# Patient Record
Sex: FEMALE | Race: WHITE | Employment: OTHER | URBAN - METROPOLITAN AREA
[De-identification: names, ages, dates, MRNs, and addresses within clinical notes are randomized per-mention and may not be internally consistent; named-entity substitution may affect disease eponyms.]

---

## 2017-05-24 ENCOUNTER — HOSPITAL ENCOUNTER (OUTPATIENT)
Dept: MAMMOGRAPHY | Age: 67
Discharge: HOME | End: 2017-05-24

## 2017-05-24 DIAGNOSIS — Z12.31 VISIT FOR SCREENING MAMMOGRAM: ICD-10-CM

## 2017-05-24 PROCEDURE — G0202 SCR MAMMO BI INCL CAD: HCPCS

## 2017-11-06 ENCOUNTER — HOSPITAL ENCOUNTER (OUTPATIENT)
Dept: CT IMAGING | Age: 67
Discharge: HOME | End: 2017-11-06
Attending: FAMILY MEDICINE
Payer: MEDICARE

## 2017-11-06 DIAGNOSIS — R07.81 PLEURITIC CHEST PAIN: ICD-10-CM

## 2017-11-06 DIAGNOSIS — J90 PLEURAL EFFUSION: ICD-10-CM

## 2017-11-06 LAB — CREAT BLD-MCNC: 0.7 MG/DL (ref 0.6–1.3)

## 2017-11-06 PROCEDURE — 71260 CT THORAX DX C+: CPT

## 2017-11-06 PROCEDURE — 74011250636 HC RX REV CODE- 250/636: Performed by: FAMILY MEDICINE

## 2017-11-06 PROCEDURE — 74011636320 HC RX REV CODE- 636/320: Performed by: FAMILY MEDICINE

## 2017-11-06 PROCEDURE — 82565 ASSAY OF CREATININE: CPT

## 2017-11-06 RX ORDER — SODIUM CHLORIDE 0.9 % (FLUSH) 0.9 %
10 SYRINGE (ML) INJECTION
Status: COMPLETED | OUTPATIENT
Start: 2017-11-06 | End: 2017-11-06

## 2017-11-06 RX ORDER — SODIUM CHLORIDE 9 MG/ML
50 INJECTION, SOLUTION INTRAVENOUS
Status: COMPLETED | OUTPATIENT
Start: 2017-11-06 | End: 2017-11-06

## 2017-11-06 RX ADMIN — Medication 10 ML: at 18:41

## 2017-11-06 RX ADMIN — SODIUM CHLORIDE 50 ML/HR: 900 INJECTION, SOLUTION INTRAVENOUS at 18:40

## 2017-11-06 RX ADMIN — IOPAMIDOL 100 ML: 612 INJECTION, SOLUTION INTRAVENOUS at 18:40

## 2017-11-13 RX ORDER — FLUTICASONE PROPIONATE AND SALMETEROL 250; 50 UG/1; UG/1
1 POWDER RESPIRATORY (INHALATION) 2 TIMES DAILY
COMMUNITY

## 2017-11-13 RX ORDER — POTASSIUM CHLORIDE 20 MEQ/1
20 TABLET, EXTENDED RELEASE ORAL DAILY
COMMUNITY
End: 2017-12-20

## 2017-11-14 ENCOUNTER — HOSPITAL ENCOUNTER (OUTPATIENT)
Age: 67
Setting detail: OUTPATIENT SURGERY
Discharge: HOME | End: 2017-11-14
Attending: INTERNAL MEDICINE | Admitting: INTERNAL MEDICINE
Payer: MEDICARE

## 2017-11-14 VITALS
HEART RATE: 97 BPM | RESPIRATION RATE: 15 BRPM | DIASTOLIC BLOOD PRESSURE: 74 MMHG | SYSTOLIC BLOOD PRESSURE: 150 MMHG | OXYGEN SATURATION: 91 % | TEMPERATURE: 98.4 F

## 2017-11-14 PROCEDURE — 77030009046 HC CATH BRNCH BLLN OCOA -B: Performed by: INTERNAL MEDICINE

## 2017-11-14 PROCEDURE — 99152 MOD SED SAME PHYS/QHP 5/>YRS: CPT

## 2017-11-14 PROCEDURE — 88173 CYTOPATH EVAL FNA REPORT: CPT | Performed by: INTERNAL MEDICINE

## 2017-11-14 PROCEDURE — 76040000007: Performed by: INTERNAL MEDICINE

## 2017-11-14 PROCEDURE — 74011000250 HC RX REV CODE- 250: Performed by: INTERNAL MEDICINE

## 2017-11-14 PROCEDURE — 74011250636 HC RX REV CODE- 250/636

## 2017-11-14 PROCEDURE — 77030015483 HC NDL ASPIR OCOA -B: Performed by: INTERNAL MEDICINE

## 2017-11-14 PROCEDURE — 77030012699 HC VLV SUC CNTRL OCOA -A: Performed by: INTERNAL MEDICINE

## 2017-11-14 PROCEDURE — 77030022556 HC FCPS BIOP TIS ENDOSC BSC -B: Performed by: INTERNAL MEDICINE

## 2017-11-14 PROCEDURE — 88305 TISSUE EXAM BY PATHOLOGIST: CPT | Performed by: INTERNAL MEDICINE

## 2017-11-14 PROCEDURE — 88172 CYTP DX EVAL FNA 1ST EA SITE: CPT | Performed by: INTERNAL MEDICINE

## 2017-11-14 PROCEDURE — 74011250636 HC RX REV CODE- 250/636: Performed by: INTERNAL MEDICINE

## 2017-11-14 RX ORDER — SODIUM CHLORIDE 0.9 % (FLUSH) 0.9 %
5-10 SYRINGE (ML) INJECTION AS NEEDED
Status: DISCONTINUED | OUTPATIENT
Start: 2017-11-14 | End: 2017-11-14 | Stop reason: HOSPADM

## 2017-11-14 RX ORDER — ATROPINE SULFATE 0.1 MG/ML
0.5 INJECTION INTRAVENOUS
Status: DISCONTINUED | OUTPATIENT
Start: 2017-11-14 | End: 2017-11-14 | Stop reason: HOSPADM

## 2017-11-14 RX ORDER — DIPHENHYDRAMINE HYDROCHLORIDE 50 MG/ML
50 INJECTION, SOLUTION INTRAMUSCULAR; INTRAVENOUS ONCE
Status: COMPLETED | OUTPATIENT
Start: 2017-11-14 | End: 2017-11-14

## 2017-11-14 RX ORDER — NALOXONE HYDROCHLORIDE 0.4 MG/ML
0.4 INJECTION, SOLUTION INTRAMUSCULAR; INTRAVENOUS; SUBCUTANEOUS
Status: DISCONTINUED | OUTPATIENT
Start: 2017-11-14 | End: 2017-11-14 | Stop reason: HOSPADM

## 2017-11-14 RX ORDER — MIDAZOLAM HYDROCHLORIDE 1 MG/ML
.25-5 INJECTION, SOLUTION INTRAMUSCULAR; INTRAVENOUS
Status: DISCONTINUED | OUTPATIENT
Start: 2017-11-14 | End: 2017-11-14 | Stop reason: HOSPADM

## 2017-11-14 RX ORDER — SODIUM CHLORIDE 0.9 % (FLUSH) 0.9 %
5-10 SYRINGE (ML) INJECTION EVERY 8 HOURS
Status: DISCONTINUED | OUTPATIENT
Start: 2017-11-14 | End: 2017-11-14 | Stop reason: HOSPADM

## 2017-11-14 RX ORDER — FLUMAZENIL 0.1 MG/ML
0.2 INJECTION INTRAVENOUS
Status: DISCONTINUED | OUTPATIENT
Start: 2017-11-14 | End: 2017-11-14 | Stop reason: HOSPADM

## 2017-11-14 RX ORDER — LIDOCAINE HYDROCHLORIDE 40 MG/ML
10 SOLUTION TOPICAL AS NEEDED
Status: DISCONTINUED | OUTPATIENT
Start: 2017-11-14 | End: 2017-11-14 | Stop reason: HOSPADM

## 2017-11-14 RX ORDER — FENTANYL CITRATE 50 UG/ML
25-50 INJECTION, SOLUTION INTRAMUSCULAR; INTRAVENOUS
Status: DISCONTINUED | OUTPATIENT
Start: 2017-11-14 | End: 2017-11-14 | Stop reason: HOSPADM

## 2017-11-14 RX ORDER — SODIUM CHLORIDE 9 MG/ML
100 INJECTION, SOLUTION INTRAVENOUS CONTINUOUS
Status: DISCONTINUED | OUTPATIENT
Start: 2017-11-14 | End: 2017-11-14 | Stop reason: HOSPADM

## 2017-11-14 RX ORDER — ONDANSETRON 2 MG/ML
INJECTION INTRAMUSCULAR; INTRAVENOUS
Status: COMPLETED
Start: 2017-11-14 | End: 2017-11-14

## 2017-11-14 RX ORDER — LIDOCAINE HYDROCHLORIDE 40 MG/ML
30 SOLUTION TOPICAL AS NEEDED
Status: DISCONTINUED | OUTPATIENT
Start: 2017-11-14 | End: 2017-11-14 | Stop reason: HOSPADM

## 2017-11-14 RX ORDER — ONDANSETRON 2 MG/ML
4-8 INJECTION INTRAMUSCULAR; INTRAVENOUS ONCE
Status: DISCONTINUED | OUTPATIENT
Start: 2017-11-14 | End: 2017-11-14 | Stop reason: HOSPADM

## 2017-11-14 RX ADMIN — SODIUM CHLORIDE 100 ML/HR: 900 INJECTION, SOLUTION INTRAVENOUS at 12:10

## 2017-11-14 RX ADMIN — FENTANYL CITRATE 25 MCG: 50 INJECTION, SOLUTION INTRAMUSCULAR; INTRAVENOUS at 13:13

## 2017-11-14 RX ADMIN — LIDOCAINE HYDROCHLORIDE 10 ML: 40 SOLUTION TOPICAL at 12:30

## 2017-11-14 RX ADMIN — FENTANYL CITRATE 50 MCG: 50 INJECTION, SOLUTION INTRAMUSCULAR; INTRAVENOUS at 12:55

## 2017-11-14 RX ADMIN — MIDAZOLAM HYDROCHLORIDE 0.5 MG: 1 INJECTION, SOLUTION INTRAMUSCULAR; INTRAVENOUS at 13:15

## 2017-11-14 RX ADMIN — MIDAZOLAM HYDROCHLORIDE 0.5 MG: 1 INJECTION, SOLUTION INTRAMUSCULAR; INTRAVENOUS at 13:19

## 2017-11-14 RX ADMIN — DIPHENHYDRAMINE HYDROCHLORIDE 50 MG: 50 INJECTION, SOLUTION INTRAMUSCULAR; INTRAVENOUS at 12:55

## 2017-11-14 RX ADMIN — ONDANSETRON 4 MG: 2 INJECTION INTRAMUSCULAR; INTRAVENOUS at 12:38

## 2017-11-14 RX ADMIN — MIDAZOLAM HYDROCHLORIDE 2 MG: 1 INJECTION, SOLUTION INTRAMUSCULAR; INTRAVENOUS at 12:55

## 2017-11-14 RX ADMIN — FENTANYL CITRATE 25 MCG: 50 INJECTION, SOLUTION INTRAMUSCULAR; INTRAVENOUS at 13:19

## 2017-11-14 NOTE — PROGRESS NOTES
Notified Dr Eli Mccloud of patients allergy to Lidocaine and her listed reaction as nausea and headache. Okay to proceed with administration of pre procedure lidocaine and monitor for nausea. Orders received for zofran as needed for nausea.

## 2017-11-14 NOTE — DISCHARGE INSTRUCTIONS
[facility] [place]  Pulmonary, Critical Care, and Sleep Medicine    Name: Portia Serrano MRN: [id number]   : 1950 Hospital: [facility]   Date: 2017        BRONCHOSCOPY / EBUS DISCHARGE INSTRUCTIONS  Discomfort:  Sore throat- throat lozenges or warm salt water gargle  redness at IV site- apply warm compress to area; if redness or soreness persist- contact your physician  Gaseous discomfort- walking, belching will help relieve any discomfort  Should not operate a vehicle for at least 12 hours  You should not engage in an occupation involving machinery or appliances for rest of today  You should not drink alcoholic beverages for at least 12 hours  Avoid making any critical decisions for at least 24 hour  Blood tinged secretions - this should stop within 2-3 hours  DIET  Nothing by mouth- do not eat or drink for two hours. You may eat and drink after 2:30 pm.   You may resume your regular diet - however -  remember your colon is empty and a heavy meal will produce gas. Avoid these foods:  vegetables, fried / greasy foods, carbonated drinks  ACTIVITY  You may resume your normal daily activities however it is recommended that you spend the remainder of the day resting -  avoid any strenuous activity. CALL M.D. ANY SIGN OF   Increasing pain, nausea, vomiting  New increased bleeding  Fever (chills)  Pain in chest area  Bloody discharge from nose or mouth  Shortness of breath  Bubbles under the skin around the collarbone. These may crackle and pop when you press on them. Coughing up more than ½ cup of blood. Call physicians office for the following  Telephone #  [phone]  Additional instructions: If you have not heard about your next appointments by Friday, please call the number listed above. [employer] Being [employer] Activation    Thank you for requesting access to [employer] Being [employer]. Please follow the instructions below to securely access and download your online medical record.  [employer] Being Watched allows you to send messages to your doctor, view your test results, renew your prescriptions, schedule appointments, and more. How Do I Sign Up? 1. In your internet browser, go to [email or web address]  2. Click on the First Time User? Click Here link in the Sign In box. You will be redirect to the New Member Sign Up page. 3. Enter your [employer] Access Code exactly as it appears below. You will not need to use this code after youve completed the sign-up process. If you do not sign up before the expiration date, you must request a new code. [employer] Access Code: [email or web address]  Expires: 2018 12:52 PM (This is the date your [employer] access code will )    4. Enter the last four digits of your Social Security Number (xxxx) and Date of Birth (mm/dd/yyyy) as indicated and click Submit. You will be taken to the next sign-up page. 5. Create a Arantech ID. This will be your Ar[employer] login ID and cannot be changed, so think of one that is secure and easy to remember. 6. Create a [employer] password. You can change your password at any time. 7. Enter your Password Reset Question and Answer. This can be used at a later time if you forget your password. 8. Enter your e-mail address. You will receive e-mail notification when new information is available in [address] 9. Click Sign Up. You can now view and download portions of your medical record. 10. Click the Download Summary menu link to download a portable copy of your medical information. Additional Information    If you have questions, please visit the Frequently Asked Questions section of the [employer] website at [email or web address] Remember, [employer] is NOT to be used for urgent needs. For medical emergencies, dial 911.

## 2017-11-14 NOTE — ROUTINE PROCESS
Selina Manley  1950  [id number]    Situation:  Verbal report received from: Hal Serrano RN  Procedure: Procedure(s):  ENDOSCOPIC BRONCHOSCOPY ULTRASOUND (EBUS) (CON-SEDATION WITH SALINE IV)  BRONCHOSCOPY    Background:    Preoperative diagnosis: MULTIPLE PULMONARY NODULES  Postoperative diagnosis: abnormal chest CT;adenocarcinoma    :  Dr. Sreedhar Iqbal  Assistant(s): Endoscopy RN-1: Madisyn Flowers  Endoscopy RN-2: Craig Kulkarni RN; Ashely Wall RN    Specimens: * No specimens in log *  H. Pylori  no    Assessment:  Intra-procedure medications   Versed  yes3 mg  Fentanyl yes 100 mcg  Demerol no  Benadryl  Yes 50 mcg    Anesthesia gave intra-procedure sedation and medications, see anesthesia flow sheet no    Intravenous fluids: NS@ KVO     Vital signs stable       Abdominal assessment: round and soft       Recommendation:  Discharge patient per MD order  .     Family or Friend   here  Anastasiya Cooley to share finding with family or friend yes

## 2017-11-14 NOTE — PROGRESS NOTES
No crepitus noted. Pt aware to check for this and to report to Dr. Valentino Massa if noted. Late entry: At 1409 upon discharge- no crepitus. No respiratory distress.

## 2017-11-14 NOTE — H&P
Date of Surgery Update:  Kendall Farris was seen and examined. History and physical has been reviewed. The patient has been examined.  There have been no significant clinical changes since the completion of the originally dated History and Physical.    Signed By: Amadou Kennedy MD     November 14, 2017 12:50 PM

## 2017-11-14 NOTE — PROCEDURES
[facility]  Pulmonary, Critical Care, and Sleep Medicine    Name: Portia Serrano MRN: [id number]   : 1950 Hospital: [facility]   Date: 2017        Bronchoscopy with Endobronchial Ultrasound-Guided Transbronchial Needle Aspiration    Procedure: Diagnostic bronchoscopy. Indication: Abnormal chest imaging    Consent/Treatment: Informed consent was obtained from the  patient after risks, benefits and alternatives were explained. Timeout verified the correct patient and correct procedure. Anesthesia:   Topical sedation to nares, mouth, and tracheobronchial tree with lidocaine  Moderate sedation with Fentanyl 100 mcg, Versed 3mg and Benadryl 50 mg was used  Moderate (conscious) sedation was administered by the endoscopy nurse and supervised by the endoscopist.  The following parameters were monitored: oxygen saturation, heart rate, blood pressure, respiratory rate, EKG, adequacy of pulmonary ventilation, and response to care. Total physician intraservice time was 25 minutes. Procedure Details:   -- The bronchoscope was introduced orally with use of a bite block. -- The vocal cords were found to be normal.  -- The trachea and dariana were completely inspected and were found to be normal.  -- The right-sided endobronchial anatomy was completely inspected and was found to be normal.  -- The left-sided endobronchial anatomy was completely inspected and was found to be normal.     The bronchoscope was then withdrawn and an EBUS bronchoscope was introduced into the trachea.  Mediastinal inspection revealed:    STATION SIZE TBNA   2L -    2R  -    4L -    4R -    7 -    10L -    10R -    11L 2 cm x1   11R            The procedure was terminated due to patient tolerability (coughing, awake despite sedation)    Specimens:   Transbronchial needle aspiration from the 11L lymph node was sent for  cytology    Rapid On-Site Evaluation: A preliminary diagnosis of adenocarcinoma    Complications: none    Estimated Blood Loss: Minimal    Iris Turner MD     Procedure terminated after 1 (positive) biopsy due to patient tolerability. Hopefully she will be a surgical candidate. If so, further tissue for EGFR mutation, etc, can be obtained at that time. If not, would need repeat procedure with anesthesia.

## 2017-11-14 NOTE — IP AVS SNAPSHOT
[address] [facility] 
[phone] Patient: Jasmin Lindsey MRN: [id number] PIM:73/96/0526 About your hospitalization You were admitted on:  November 14, 2017 You last received care in the:  [facility] ENDOSCOPY You were discharged on:  November 14, 2017 Why you were hospitalized Your primary diagnosis was:  Not on File Discharge Orders None A check zana indicates which time of day the medication should be taken. My Medications TAKE these medications as instructed Instructions Each Dose to Equal  
 Morning Noon Evening Bedtime ADVAIR DISKUS 250-50 mcg/dose diskus inhaler Generic drug:  fluticasone-salmeterol Your last dose was: Your next dose is: Take 1 Puff by inhalation two (2) times a day. 1 Puff  
    
   
   
   
  
 albuterol 2.5 mg /3 mL (0.083 %) nebulizer solution Commonly known as:  PROVENTIL VENTOLIN Your last dose was: Your next dose is:    
   
   
 by Nebulization route once. Only for emergencies or if an infection sets in  
     
   
   
   
  
 aspirin delayed-release 81 mg tablet Your last dose was: Your next dose is: Take  by mouth daily. fenofibrate 160 mg tablet Commonly known as:  LOFIBRA Your last dose was: Your next dose is: Take 160 mg by mouth daily. 160 mg KLOR-CON M20 20 mEq tablet Generic drug:  potassium chloride Your last dose was: Your next dose is: Take 20 mEq by mouth daily. 20 mEq MULTIVITAMIN PO Your last dose was: Your next dose is: Take  by mouth. PROBIOTIC PO Your last dose was: Your next dose is: Take  by mouth. 1 daily SYMBICORT 160-4.5 mcg/actuation Hfaa Generic drug:  budesonide-formoterol Your last dose was: Your next dose is: Take 2 Puffs by inhalation two (2) times a day. 2 Puff  
    
   
   
   
  
 triamterene-hydroCHLOROthiazide 37.5-25 mg per capsule Commonly known as:  Blessing Dotsonly Your last dose was: Your next dose is: Take  by mouth daily. ZETIA 10 mg tablet Generic drug:  ezetimibe Your last dose was: Your next dose is: Take  by mouth. Discharge Instructions [facility] [place] Pulmonary, Critical Care, and Sleep Medicine Name: Trent Martinez MRN: [id number] : 1950 Hospital: [facility] Date: 2017 BRONCHOSCOPY / EBUS DISCHARGE INSTRUCTIONS Discomfort: 
Sore throat- throat lozenges or warm salt water gargle 
redness at IV site- apply warm compress to area; if redness or soreness persist- contact your physician Gaseous discomfort- walking, belching will help relieve any discomfort Should not operate a vehicle for at least 12 hours You should not engage in an occupation involving machinery or appliances for rest of today You should not drink alcoholic beverages for at least 12 hours Avoid making any critical decisions for at least 24 hour Blood tinged secretions  this should stop within 2-3 hours DIET Nothing by mouth- do not eat or drink for two hours. You may eat and drink after 2:30 pm. 
 You may resume your regular diet  however -  remember your colon is empty and a heavy meal will produce gas. Avoid these foods:  vegetables, fried / greasy foods, carbonated drinks ACTIVITY You may resume your normal daily activities however it is recommended that you spend the remainder of the day resting -  avoid any strenuous activity. CALL M.D. ANY SIGN OF Increasing pain, nausea, vomiting New increased bleeding Fever (chills) Pain in chest area Bloody discharge from nose or mouth Shortness of breath Bubbles under the skin around the collarbone. These may crackle and pop when you press on them. Coughing up more than ½ cup of blood. Call physicians office for the following Telephone #  [phone] Additional instructions: If you have not heard about your next appointments by Friday, please call the number listed above. MyChart Activation Thank you for requesting access to [employer]. Please follow the instructions below to securely access and download your online medical record. [employer] allows you to send messages to your doctor, view your test results, renew your prescriptions, schedule appointments, and more. How Do I Sign Up? 1. In your internet browser, go to [email or web address] 
2. Click on the First Time User? Click Here link in the Sign In box. You will be redirect to the New Member Sign Up page. 3. Enter your [employer] Access Code exactly as it appears below. You will not need to use this code after youve completed the sign-up process. If you do not sign up before the expiration date, you must request a new code. [employer] Access Code: [email or web address] Expires: 2018 12:52 PM (This is the date your [employer] access code will ) 4. Enter the last four digits of your Social Security Number (xxxx) and Date of Birth (mm/dd/yyyy) as indicated and click Submit. You will be taken to the next sign-up page. 5. Create a [employer] ID. This will be your [employer] login ID and cannot be changed, so think of one that is secure and easy to remember. 6. Create a [employer] password. You can change your password at any time. 7. Enter your Password Reset Question and Answer. This can be used at a later time if you forget your password. 8. Enter your e-mail address. You will receive e-mail notification when new information is available in [address] 9. Click Sign Up. You can now view and download portions of your medical record. 10. Click the Download Summary menu link to download a portable copy of your medical information. Additional Information If you have questions, please visit the Frequently Asked Questions section of the [employer] website at [email or web address]t/. Remember, [employer]ros is NOT to be used for urgent needs. For medical emergencies, dial 911. Introducing [facility] & [facility] SERVICES! Alexandr Duncan introduces Gyros patient portal. Now you can access parts of your medical record, email your doctor's office, and request medication refills online. 1. In your internet browser, go to [email or web address] 2. Click on the First Time User? Click Here link in the Sign In box. You will see the New Member Sign Up page. 3. Enter your Gyros Access Code exactly as it appears below. You will not need to use this code after youve completed the sign-up process. If you do not sign up before the expiration date, you must request a new code. · Gyros Access Code: [email or web address] Expires: 2/4/2018 12:52 PM 
 
4. Enter the last four digits of your Social Security Number (xxxx) and Date of Birth (mm/dd/yyyy) as indicated and click Submit. You will be taken to the next sign-up page. 5. Create a Gyros ID. This will be your Gyros login ID and cannot be changed, so think of one that is secure and easy to remember. 6. Create a Gyros password. You can change your password at any time. 7. Enter your Password Reset Question and Answer. This can be used at a later time if you forget your password. 8. Enter your e-mail address. You will receive e-mail notification when new information is available in [address] 9. Click Sign Up. You can now view and download portions of your medical record. 10. Click the Download Summary menu link to download a portable copy of your medical information. If you have questions, please visit the Frequently Asked Questions section of the MyChart website. Remember, MyChart is NOT to be used for urgent needs. For medical emergencies, dial 911. Now available from your iPhone and Android! Providers Seen During Your Hospitalization Provider Specialty Primary office phone Amadou Kennedy MD Pulmonary Disease [phone] Your Primary Care Physician (PCP) Primary Care Physician Office Phone Office Fax Ginger Deluna [phone] You are allergic to the following Allergen Reactions Demerol (Meperidine) Nausea and Vomiting Doxycycline Other (comments) Female infection Erythromycin Other (comments) Female infection Hydrocodone Itching Dizzy, Headache Penicillins Other (comments) From childhood Percodan (Oxycodone Hcl-Oxycodone-Asa) Nausea and Vomiting  
 headache  
    
 Sulfa (Sulfonamide Antibiotics) Swelling Tetracycline Other (comments) Female infection Xylocaine (Lidocaine Hcl) Nausea Only  
 headache Recent Documentation Breastfeeding? OB Status Smoking Status No Hysterectomy Former Smoker Emergency Contacts Name Discharge Info Relation Home Work Mobile Pool,Jonh DISCHARGE CAREGIVER [3] Spouse [3] [phone] Patient Belongings The following personal items are in your possession at time of discharge: 
  Dental Appliances: None  Visual Aid: Glasses, With patient Please provide this summary of care documentation to your next provider. Signatures-by signing, you are acknowledging that this After Visit Summary has been reviewed with you and you have received a copy. Patient Signature:  ____________________________________________________________ Date:  ____________________________________________________________  
  
Janyth Mins Provider Signature:  ____________________________________________________________ Date:  ____________________________________________________________

## 2017-11-20 ENCOUNTER — HOSPITAL ENCOUNTER (OUTPATIENT)
Dept: PET IMAGING | Age: 67
Discharge: HOME | End: 2017-11-20
Attending: INTERNAL MEDICINE
Payer: MEDICARE

## 2017-11-20 VITALS — BODY MASS INDEX: 27.66 KG/M2 | HEIGHT: 64 IN | WEIGHT: 162 LBS

## 2017-11-20 DIAGNOSIS — C34.90 LUNG CANCER (HCC): ICD-10-CM

## 2017-11-20 PROCEDURE — A9552 F18 FDG: HCPCS

## 2017-11-20 RX ORDER — SODIUM CHLORIDE 0.9 % (FLUSH) 0.9 %
10 SYRINGE (ML) INJECTION
Status: COMPLETED | OUTPATIENT
Start: 2017-11-20 | End: 2017-11-20

## 2017-11-20 RX ADMIN — Medication 10 ML: at 11:00

## 2017-12-04 ENCOUNTER — OFFICE VISIT (OUTPATIENT)
Dept: SURGERY | Age: 67
End: 2017-12-04

## 2017-12-04 ENCOUNTER — HOSPITAL ENCOUNTER (OUTPATIENT)
Dept: LAB | Age: 67
Discharge: HOME | End: 2017-12-04

## 2017-12-04 VITALS
RESPIRATION RATE: 18 BRPM | WEIGHT: 158.6 LBS | HEIGHT: 65 IN | BODY MASS INDEX: 26.42 KG/M2 | DIASTOLIC BLOOD PRESSURE: 83 MMHG | OXYGEN SATURATION: 97 % | HEART RATE: 99 BPM | SYSTOLIC BLOOD PRESSURE: 148 MMHG

## 2017-12-04 DIAGNOSIS — C34.90 MALIGNANT NEOPLASM OF LUNG, UNSPECIFIED LATERALITY, UNSPECIFIED PART OF LUNG (HCC): Primary | ICD-10-CM

## 2017-12-04 PROCEDURE — 81235 EGFR GENE COM VARIANTS: CPT | Performed by: SURGERY

## 2017-12-04 PROCEDURE — 88342 IMHCHEM/IMCYTCHM 1ST ANTB: CPT | Performed by: SURGERY

## 2017-12-04 NOTE — MR AVS SNAPSHOT
Visit Information Date & Time Provider Department Dept. Phone Encounter #  
 12/4/2017  9:40 AM Hermila Wells MD [facility] [place] [id number] Upcoming Health Maintenance Date Due Hepatitis C Screening 1950 DTaP/Tdap/Td series (1 - Tdap) 10/15/1971 FOBT Q 1 YEAR AGE 50-75 10/15/2000 ZOSTER VACCINE AGE 60> 8/15/2010 GLAUCOMA SCREENING Q2Y 10/15/2015 OSTEOPOROSIS SCREENING (DEXA) 10/15/2015 Pneumococcal 65+ High/Highest Risk (1 of 2 - PCV13) 10/15/2015 MEDICARE YEARLY EXAM 10/15/2015 Influenza Age 5 to Adult 8/1/2017 BREAST CANCER SCRN MAMMOGRAM 5/24/2019 Allergies as of 12/4/2017  Review Complete On: 12/4/2017 By: Hermila Wells MD  
  
 Severity Noted Reaction Type Reactions Demerol [Meperidine]  04/02/2014    Nausea and Vomiting Doxycycline  04/02/2014    Other (comments) Female infection Erythromycin  04/02/2014    Other (comments) Female infection Hydrocodone  08/13/2014   Systemic Itching Dizzy, Headache Penicillins  04/02/2014    Other (comments) From childhood Percodan [Oxycodone Hcl-oxycodone-asa]  04/02/2014    Nausea and Vomiting  
 headache  
 Sulfa (Sulfonamide Antibiotics)  04/02/2014    Swelling Tetracycline  04/02/2014    Other (comments) Female infection Xylocaine [Lidocaine Hcl]  04/02/2014    Nausea Only  
 headache Current Immunizations  Never Reviewed No immunizations on file. Not reviewed this visit You Were Diagnosed With   
  
 Codes Comments Malignant neoplasm of lung, unspecified laterality, unspecified [facility]    -  Primary ICD-10-CM: C34.90 ICD-9-CM: 162.9 Vitals BP Pulse Resp Height(growth percentile) Weight(growth percentile) SpO2  
 148/83 (BP 1 Location: Left arm, BP Patient Position: Sitting) 99 18 5' 5\" (1.651 m) 158 lb 9.6 oz (71.9 kg) 97% BMI OB Status Smoking Status 26.39 kg/m2 Hysterectomy Former Smoker Vitals History BMI and BSA Data Body Mass Index Body Surface Area  
 26.39 kg/m 2 1.82 m 2 Preferred Pharmacy Pharmacy Name Phone [facility][id number] [address] [phone] Your Updated Medication List  
  
   
This list is accurate as of: 12/4/17  4:49 PM.  Always use your most recent med list.  
  
  
  
  
 Junella Moores 250-50 mcg/dose diskus inhaler Generic drug:  fluticasone-salmeterol Take 1 Puff by inhalation two (2) times a day. albuterol 2.5 mg /3 mL (0.083 %) nebulizer solution Commonly known as:  PROVENTIL VENTOLIN  
by Nebulization route once. Only for emergencies or if an infection sets in  
  
 aspirin delayed-release 81 mg tablet Take  by mouth daily. fenofibrate 160 mg tablet Commonly known as:  LOFIBRA Take 160 mg by mouth daily. KLOR-CON M20 20 mEq tablet Generic drug:  potassium chloride Take 20 mEq by mouth daily. MULTIVITAMIN PO Take  by mouth. PROBIOTIC PO Take  by mouth. 1 daily SYMBICORT 160-4.5 mcg/actuation Hfaa Generic drug:  budesonide-formoterol Take 2 Puffs by inhalation two (2) times a day. triamterene-hydroCHLOROthiazide 37.5-25 mg per capsule Commonly known as:  Jag Plana Take  by mouth daily. ZETIA 10 mg tablet Generic drug:  ezetimibe Take  by mouth. We Performed the Following SURGICAL PATHOLOGY [CEE1067 Custom] To-Do List   
 12/07/2017 7:45 PM  
  Appointment with [facility] MRI 2 at [facility] MRI Department ([phone]) 1. Please bring a list or a bag of your current medications to your appointment 2. Please be sure to remove ALL hair clips, pins, extensions, etc., prior to arriving for your MRI procedure. 3. If you have any medical implants or devices, please bring associated medical card with you.  4. Bring any non [facility] films or CDs pertaining to the area being imaged with you on the day of appointment. 5. A written order with a valid diagnosis and Physicians  signature is required for all scheduled tests. 6. Check in at registration 30min before your appointment time unless you were instructed to do otherwise. Introducing [facility] & HEALTH SERVICES! Dear Carlin Aguilar: Thank you for requesting a [employer] account. Our records indicate that you already have an active [employer] account. You can access your account anytime at [email or web address] Did you know that you can access your hospital and ER discharge instructions at any time in [employer]? You can also review all of your test results from your hospital stay or ER visit. Additional Information If you have questions, please visit the Frequently Asked Questions section of the [employer] website at [email or web address] Remember, [employer] is NOT to be used for urgent needs. For medical emergencies, dial 911. Now available from your iPhone and Android! Please provide this summary of care documentation to your next provider. Your primary care clinician is listed as Mayito Nails. If you have any questions after today's visit, please call [phone].

## 2017-12-06 NOTE — PROGRESS NOTES
To: Dr. Caprice Chao  CC:  Hortensia Talamantes MD, Tiff Sweet MD    From: Lionel Garcia MD    Thank you for sending Hema Funez to see us. Encounter Date: 12/4/2017  History and Physical    Assessment:   Known stage IV non-small cell lung cancer. PET avid left supraclavicular mass. Plan/Recommendations/Medical Decision Making:   Need for molecular testing to guide therapy. FNA did not provide enough tissue. Will do core biopsies of the left supraclavicular mass today. Risks and benefits discussed. She wishes to proceed. HPI:   Patient is a 79 y.o. female who is seen in consultation at the request of Dr. Caprice Chao. She has been diagnosis via FNA with NSCLC. Appears to have primary in the MYESHA and PET shows mediastinal LAD, bony mets and a PET avid mass/LAD in the left supraclavicular region. She has seen Dr. Deven Mendoza who is planning treatment, but need additional tissue for molecular testing.      Past Medical History:   Diagnosis Date    Arthritis     hands    Back pain     Cancer ([address]     scalp on back of head    Chronic obstructive pulmonary disease (HCC)     Diverticulitis     Hypercholesterolemia     Hypertension     IBS (irritable bowel syndrome)     Menopause 1982    Muscle ache       Past Surgical History:   Procedure Laterality Date    [address] [facility] [address]  [address]    umbilical    HX HYSTERECTOMY  1982    HX OOPHORECTOMY Left 1982    HX OOPHORECTOMY Right 1983    HX OTHER [address] [address]    D&C    [address] [place] [address] [place]    partial hysterectomy    HX OTHER SURGICAL  1982    hysterectomy    HX OTHER SURGICAL  1982    breast implants, [facility]    [address] [facility] OTHER SURGICAL  2011    implant removal, [place] Bilateral [address]158    REMOVAL OF BREAST IMPLANT Bilateral 1989     Social History   Substance Use Topics    Smoking status: Former Smoker     Packs/day: 1.00     Years: 17.00     Quit date: 4/2/1986    Smokeless tobacco: Never Used    Alcohol use No      Family History   Problem Relation Age of Onset    Family history unknown: Yes       Current Outpatient Prescriptions   Medication Sig    potassium chloride (KLOR-CON M20) 20 mEq tablet Take 20 mEq by mouth daily.  fluticasone-salmeterol (ADVAIR DISKUS) 250-50 mcg/dose diskus inhaler Take 1 Puff by inhalation two (2) times a day.  ezetimibe (ZETIA) 10 mg tablet Take  by mouth.  fenofibrate (TRICOR) 160 mg tablet Take 160 mg by mouth daily.  triamterene-hydrochlorothiazide (DYAZIDE) 37.5-25 mg per capsule Take  by mouth daily.  budesonide-formoterol (SYMBICORT) 160-4.5 mcg/actuation HFA inhaler Take 2 Puffs by inhalation two (2) times a day.  LACTOBACILLUS ACIDOPHILUS (PROBIOTIC PO) Take  by mouth. 1 daily    MULTIVITAMIN PO Take  by mouth.  albuterol (PROVENTIL VENTOLIN) 2.5 mg /3 mL (0.083 %) nebulizer solution by Nebulization route once. Only for emergencies or if an infection sets in    aspirin delayed-release 81 mg tablet Take  by mouth daily. No current facility-administered medications for this visit. Allergies: Allergies   Allergen Reactions    Demerol [Meperidine] Nausea and Vomiting    Doxycycline Other (comments)     Female infection    Erythromycin Other (comments)     Female infection    Hydrocodone Itching     Dizzy, Headache    Penicillins Other (comments)     From childhood    Percodan [Oxycodone Hcl-Oxycodone-Asa] Nausea and Vomiting     headache    Sulfa (Sulfonamide Antibiotics) Swelling    Tetracycline Other (comments)     Female infection    Xylocaine [Lidocaine Hcl] Nausea Only     headache        Review of Systems:  10 systems reviewed. See scanned sheet in \"Media\" section. See HPI for pertinent positives and negatives.       Objective:     Visit Vitals    /83 (BP 1 Location: Left arm, BP Patient Position: Sitting)    Pulse 99    Resp 18    Ht 5' 5\" (1.651 m)    Wt 71.9 kg (158 lb 9.6 oz)    SpO2 97%    BMI 26.39 kg/m2     General appearance  Alert, cooperative, no distress, appears stated age   HEENT  Anicteric, palpable shallow mass in the medial left supraclavicular area. No JVD. Neck Supple       Lungs   CTAB   Heart  Regular rate and rhythm. No murmur, rub or gallop   Abdomen   Soft. NT. No obvious masses. Extremities No CCE. Pulses 2+ right radial   Skin Skin color, texture, turgor normal.        Neurologic Without overt sensory or motor deficit         Encounter Date: 12/4/2017    Preoperative diagnosis: above  Postoperative diagnosis: same  Procedure: ultrasound-guided core needle biopsy of the left supraclavicular mass    Time out at performed by me (see consent form):  * Patient was identified by name and date of birth   * Agreement on procedure being performed was verified  * Risks and Benefits explained to the patient  * Procedure site verified and marked as necessary  * Patient was positioned for comfort  * Consent was signed and verified    Description of the procedure: After obtaining informed consent a time out was performed any the patient was taken to the procedure room and ultrasound was used to local localize the target. Betadine was then used to prepped the skin and 2% lidocaine with epinephrine was infiltrated at the planned site of needle insertion. A stab incisions was made with a blade. Using ultrasound guidance the core device was used to sample the mass. 6 cores were taken. No hematoma was noted afterwards. Steri-strips were applied and the procedure was well tolerated. Post-procedure pain scale: 1/10.     Azam Pressley MD

## 2017-12-07 ENCOUNTER — HOSPITAL ENCOUNTER (OUTPATIENT)
Dept: MRI IMAGING | Age: 67
Discharge: HOME | End: 2017-12-07
Attending: INTERNAL MEDICINE
Payer: MEDICARE

## 2017-12-07 VITALS — BODY MASS INDEX: 26.63 KG/M2 | WEIGHT: 160 LBS

## 2017-12-07 DIAGNOSIS — C34.12 PRIMARY MALIGNANT NEOPLASM OF BRONCHUS OF LEFT UPPER LOBE (HCC): ICD-10-CM

## 2017-12-07 PROCEDURE — A9576 INJ PROHANCE MULTIPACK: HCPCS | Performed by: INTERNAL MEDICINE

## 2017-12-07 PROCEDURE — 70553 MRI BRAIN STEM W/O & W/DYE: CPT

## 2017-12-07 PROCEDURE — 74011250636 HC RX REV CODE- 250/636: Performed by: INTERNAL MEDICINE

## 2017-12-07 RX ADMIN — GADOTERIDOL 15 ML: 279.3 INJECTION, SOLUTION INTRAVENOUS at 19:29

## 2017-12-12 ENCOUNTER — TELEPHONE (OUTPATIENT)
Dept: SURGERY | Age: 67
End: 2017-12-12

## 2017-12-12 NOTE — TELEPHONE ENCOUNTER
Chely Miller called back from Dr. Juanito Spivey office. They did receive the results of biopsy. Still waiting on additional testing. They will contact the patient.

## 2017-12-12 NOTE — TELEPHONE ENCOUNTER
Called spoke to Mrs. Sue. Advised her results are in from biopsy. She spoke to Dr. Micheal Lofton yesterday and she said they weren't in. She has appointment with Dr. Micheal Lofton on this Friday. I told her I would contact Dr. Reyez Prior office. Le Reyez Prior assistant message to call back.

## 2017-12-18 ENCOUNTER — TELEPHONE (OUTPATIENT)
Dept: SURGERY | Age: 67
End: 2017-12-18

## 2017-12-20 ENCOUNTER — HOSPITAL ENCOUNTER (OUTPATIENT)
Dept: PREADMISSION TESTING | Age: 67
Discharge: HOME | End: 2017-12-20
Attending: SURGERY
Payer: MEDICARE

## 2017-12-20 VITALS
WEIGHT: 154.1 LBS | BODY MASS INDEX: 24.77 KG/M2 | DIASTOLIC BLOOD PRESSURE: 55 MMHG | RESPIRATION RATE: 20 BRPM | TEMPERATURE: 98.6 F | SYSTOLIC BLOOD PRESSURE: 135 MMHG | HEIGHT: 66 IN | OXYGEN SATURATION: 95 % | HEART RATE: 92 BPM

## 2017-12-20 LAB
ATRIAL RATE: 96 BPM
CALCULATED P AXIS, ECG09: 74 DEGREES
CALCULATED R AXIS, ECG10: 56 DEGREES
CALCULATED T AXIS, ECG11: 76 DEGREES
DIAGNOSIS, 93000: NORMAL
P-R INTERVAL, ECG05: 136 MS
Q-T INTERVAL, ECG07: 362 MS
QRS DURATION, ECG06: 104 MS
QTC CALCULATION (BEZET), ECG08: 457 MS
VENTRICULAR RATE, ECG03: 96 BPM

## 2017-12-20 PROCEDURE — 93005 ELECTROCARDIOGRAM TRACING: CPT

## 2017-12-20 RX ORDER — CHLORHEXIDINE GLUCONATE 1.2 MG/ML
15 RINSE ORAL AS NEEDED
COMMUNITY
End: 2018-01-02 | Stop reason: ALTCHOICE

## 2017-12-20 RX ORDER — SODIUM CHLORIDE, SODIUM LACTATE, POTASSIUM CHLORIDE, CALCIUM CHLORIDE 600; 310; 30; 20 MG/100ML; MG/100ML; MG/100ML; MG/100ML
25 INJECTION, SOLUTION INTRAVENOUS CONTINUOUS
Status: CANCELLED | OUTPATIENT
Start: 2017-12-22

## 2017-12-20 RX ORDER — TRAMADOL HYDROCHLORIDE 50 MG/1
50 TABLET ORAL
COMMUNITY
End: 2018-03-06

## 2017-12-20 RX ORDER — VANCOMYCIN/0.9 % SOD CHLORIDE 1.5G/250ML
1500 PLASTIC BAG, INJECTION (ML) INTRAVENOUS ONCE
Status: CANCELLED | OUTPATIENT
Start: 2017-12-22 | End: 2017-12-22

## 2017-12-20 RX ORDER — BENZONATATE 100 MG/1
100 CAPSULE ORAL
Status: ON HOLD | COMMUNITY
End: 2018-01-04

## 2017-12-20 RX ORDER — FOLIC ACID 1 MG/1
1 TABLET ORAL DAILY
COMMUNITY

## 2017-12-20 NOTE — PERIOP NOTES
[facility]  Preoperative Instructions        Surgery Date 12/22/17          Time of Arrival 0715  Contact # [phone]-- home    1. On the day of your surgery, please report to the Surgical Services Registration Desk and sign in at your designated time. The Surgery Center is located to the right of the Emergency Room. 2. You must have someone with you to drive you home. You should not drive a car for 24 hours following surgery. Please make arrangements for a friend or family member to stay with you for the first 24 hours after your surgery. 3. Do not have anything to eat or drink (including water, gum, mints, coffee, juice) after midnight ?12/21/17   . ? This may not apply to medications prescribed by your physician. ?(Please note below the special instructions with medications to take the morning of your procedure.)    4. We recommend you do not drink any alcoholic beverages for 24 hours before and after your surgery. 5. Contact your surgeons office for instructions on the following medications: non-steroidal anti-inflammatory drugs (i.e. Advil, Aleve), vitamins, and supplements. (Some surgeons will want you to stop these medications prior to surgery and others may allow you to take them)  **If you are currently taking Plavix, Coumadin, Aspirin and/or other blood-thinning agents, contact your surgeon for instructions. ** Your surgeon will partner with the physician prescribing these medications to determine if it is safe to stop or if you need to continue taking. Please do not stop taking these medications without instructions from your surgeon    6. Wear comfortable clothes. Wear glasses instead of contacts. Do not bring any money or jewelry. Please bring picture ID, insurance card, and any prearranged co-payment or hospital payment. Do not wear make-up, particularly mascara the morning of your surgery.   Do not wear nail polish, particularly if you are having foot /hand surgery. Wear your hair loose or down, no ponytails, buns, jose ramon pins or clips. All body piercings must be removed. Please shower with antibacterial soap for three consecutive days before and on the morning of surgery, but do not apply any lotions, powders or deodorants after the shower on the day of surgery. Please use a fresh towels after each shower. Please sleep in clean clothes and change bed linens the night before surgery. Please do not shave for 48 hours prior to surgery. Shaving of the face is acceptable. 7. You should understand that if you do not follow these instructions your surgery may be cancelled. If your physical condition changes (I.e. fever, cold or flu) please contact your surgeon as soon as possible. 8. It is important that you be on time. If a situation occurs where you may be late, please call [phone] (OR Holding Area). 9. If you have any questions and or problems, please call [phone] (Pre-admission Testing). 10. Your surgery time may be subject to change. You will receive a phone call the evening prior if your time changes. 11.  If having outpatient surgery, you must have someone to drive you here, stay with you during the duration of your stay, and to drive you home at time of discharge. 12.   In an effort to improve the efficiency, privacy, and safety for all of our Pre-op patients visitors are not allowed in the Holding area. Once you arrive and are registered your family/visitors will be asked to remain in the waiting room. The Pre-op staff will get you from the Surgical Waiting Area and will explain to you and your family/visitors that the Pre-op phase is beginning. The staff will answer any questions and provide instructions for tracking of the patient, by use of the existing tracking number and color-coded status board in the waiting room.   At this time the staff will also ask for your designated spokesperson information in the event that the physician or staff need to provide an update or obtain any pertinent information. The designated spokesperson will be notified if the physician needs to speak to family during the pre-operative phase. If at any time your family/visitors has questions or concerns they may approach the volunteer desk in the waiting area for assistance. Special Instructions:    MEDICATIONS TO TAKE THE MORNING OF SURGERY WITH A SIP OF WATER:use inhaler (pro-air) as needed--please bring to the hospital--advair diskus,benzonatate as needed,tramadol as needed      I understand a pre-operative phone call will be made to verify my surgery time. In the event that I am not available, I give permission for a message to be left on my answering service and/or with another person?   yes          ___________________      __________   _________    (Signature of Patient)             (Witness)                (Date and Time)

## 2017-12-21 NOTE — ADVANCED PRACTICE NURSE
EKG from 12/20/17 reviewed by Dr. Gordo Parikh, anesthesiologist and compared with 08/13/14. PMHx and planned procedure reviewed. OK to proceed with surgery per Dr. Gordo Parikh.

## 2017-12-22 ENCOUNTER — APPOINTMENT (OUTPATIENT)
Dept: GENERAL RADIOLOGY | Age: 67
End: 2017-12-22
Attending: SURGERY
Payer: MEDICARE

## 2017-12-22 ENCOUNTER — HOSPITAL ENCOUNTER (OUTPATIENT)
Age: 67
Setting detail: OUTPATIENT SURGERY
Discharge: HOME | End: 2017-12-22
Attending: SURGERY | Admitting: SURGERY
Payer: MEDICARE

## 2017-12-22 ENCOUNTER — ANESTHESIA (OUTPATIENT)
Dept: SURGERY | Age: 67
End: 2017-12-22
Payer: MEDICARE

## 2017-12-22 ENCOUNTER — ANESTHESIA EVENT (OUTPATIENT)
Dept: SURGERY | Age: 67
End: 2017-12-22
Payer: MEDICARE

## 2017-12-22 VITALS
DIASTOLIC BLOOD PRESSURE: 45 MMHG | BODY MASS INDEX: 24.87 KG/M2 | WEIGHT: 154.1 LBS | HEART RATE: 103 BPM | OXYGEN SATURATION: 91 % | SYSTOLIC BLOOD PRESSURE: 143 MMHG | RESPIRATION RATE: 20 BRPM | TEMPERATURE: 99.8 F

## 2017-12-22 DIAGNOSIS — C34.90 MALIGNANT NEOPLASM OF LUNG, UNSPECIFIED LATERALITY, UNSPECIFIED PART OF LUNG (HCC): Primary | ICD-10-CM

## 2017-12-22 LAB
APPEARANCE UR: ABNORMAL
BACTERIA URNS QL MICRO: NEGATIVE /HPF
BILIRUB UR QL CFM: NEGATIVE
CAOX CRY URNS QL MICRO: ABNORMAL
COLOR UR: ABNORMAL
EPITH CASTS URNS QL MICRO: ABNORMAL /LPF
GLUCOSE UR STRIP.AUTO-MCNC: NEGATIVE MG/DL
HGB UR QL STRIP: ABNORMAL
KETONES UR QL STRIP.AUTO: NEGATIVE MG/DL
LEUKOCYTE ESTERASE UR QL STRIP.AUTO: ABNORMAL
MUCOUS THREADS URNS QL MICRO: ABNORMAL /LPF
NITRITE UR QL STRIP.AUTO: NEGATIVE
PH UR STRIP: 5.5 [PH] (ref 5–8)
PROT UR STRIP-MCNC: NEGATIVE MG/DL
RBC #/AREA URNS HPF: ABNORMAL /HPF (ref 0–5)
SP GR UR REFRACTOMETRY: 1.03 (ref 1–1.03)
UA: UC IF INDICATED,UAUC: ABNORMAL
UROBILINOGEN UR QL STRIP.AUTO: 1 EU/DL (ref 0.2–1)
WBC URNS QL MICRO: ABNORMAL /HPF (ref 0–4)

## 2017-12-22 PROCEDURE — 76000 FLUOROSCOPY <1 HR PHYS/QHP: CPT

## 2017-12-22 PROCEDURE — 71010 XR CHEST PORT: CPT

## 2017-12-22 PROCEDURE — 77030018673: Performed by: SURGERY

## 2017-12-22 PROCEDURE — 76010000153 HC OR TIME 1.5 TO 2 HR: Performed by: SURGERY

## 2017-12-22 PROCEDURE — 74011250636 HC RX REV CODE- 250/636: Performed by: ANESTHESIOLOGY

## 2017-12-22 PROCEDURE — 74011250636 HC RX REV CODE- 250/636

## 2017-12-22 PROCEDURE — 76210000000 HC OR PH I REC 2 TO 2.5 HR: Performed by: SURGERY

## 2017-12-22 PROCEDURE — 77030031139 HC SUT VCRL2 J&J -A: Performed by: SURGERY

## 2017-12-22 PROCEDURE — 77030020782 HC GWN BAIR PAWS FLX 3M -B

## 2017-12-22 PROCEDURE — 74011250636 HC RX REV CODE- 250/636: Performed by: SURGERY

## 2017-12-22 PROCEDURE — 77030020256 HC SOL INJ NACL 0.9%  500ML: Performed by: SURGERY

## 2017-12-22 PROCEDURE — 81001 URINALYSIS AUTO W/SCOPE: CPT | Performed by: SURGERY

## 2017-12-22 PROCEDURE — 77030011640 HC PAD GRND REM COVD -A: Performed by: SURGERY

## 2017-12-22 PROCEDURE — 74011000250 HC RX REV CODE- 250: Performed by: SURGERY

## 2017-12-22 PROCEDURE — 77030002933 HC SUT MCRYL J&J -A: Performed by: SURGERY

## 2017-12-22 PROCEDURE — 77030010507 HC ADH SKN DERMBND J&J -B: Performed by: SURGERY

## 2017-12-22 PROCEDURE — C1788 PORT, INDWELLING, IMP: HCPCS | Performed by: SURGERY

## 2017-12-22 PROCEDURE — 77030003028 HC SUT VCRL J&J -A: Performed by: SURGERY

## 2017-12-22 PROCEDURE — 87086 URINE CULTURE/COLONY COUNT: CPT | Performed by: SURGERY

## 2017-12-22 PROCEDURE — 74011250637 HC RX REV CODE- 250/637

## 2017-12-22 PROCEDURE — 76210000020 HC REC RM PH II FIRST 0.5 HR: Performed by: SURGERY

## 2017-12-22 PROCEDURE — 77030018836 HC SOL IRR NACL ICUM -A: Performed by: SURGERY

## 2017-12-22 PROCEDURE — 77030008684 HC TU ET CUF COVD -B: Performed by: ANESTHESIOLOGY

## 2017-12-22 PROCEDURE — 77030026438 HC STYL ET INTUB CARD -A: Performed by: ANESTHESIOLOGY

## 2017-12-22 PROCEDURE — 76060000034 HC ANESTHESIA 1.5 TO 2 HR: Performed by: SURGERY

## 2017-12-22 DEVICE — SYSTEM INFUS PRT CATH 8FR L66CM INTRO 8FR CHST TI SGL LUMN: Type: IMPLANTABLE DEVICE | Site: CHEST | Status: FUNCTIONAL

## 2017-12-22 RX ORDER — SUCCINYLCHOLINE CHLORIDE 20 MG/ML
INJECTION INTRAMUSCULAR; INTRAVENOUS AS NEEDED
Status: DISCONTINUED | OUTPATIENT
Start: 2017-12-22 | End: 2017-12-22 | Stop reason: HOSPADM

## 2017-12-22 RX ORDER — ALBUTEROL SULFATE 90 UG/1
AEROSOL, METERED RESPIRATORY (INHALATION) AS NEEDED
Status: DISCONTINUED | OUTPATIENT
Start: 2017-12-22 | End: 2017-12-22 | Stop reason: HOSPADM

## 2017-12-22 RX ORDER — FENTANYL CITRATE 50 UG/ML
25 INJECTION, SOLUTION INTRAMUSCULAR; INTRAVENOUS
Status: DISCONTINUED | OUTPATIENT
Start: 2017-12-22 | End: 2017-12-22 | Stop reason: HOSPADM

## 2017-12-22 RX ORDER — MIDAZOLAM HYDROCHLORIDE 1 MG/ML
1 INJECTION, SOLUTION INTRAMUSCULAR; INTRAVENOUS AS NEEDED
Status: DISCONTINUED | OUTPATIENT
Start: 2017-12-22 | End: 2017-12-22 | Stop reason: HOSPADM

## 2017-12-22 RX ORDER — SODIUM CHLORIDE, SODIUM LACTATE, POTASSIUM CHLORIDE, CALCIUM CHLORIDE 600; 310; 30; 20 MG/100ML; MG/100ML; MG/100ML; MG/100ML
25 INJECTION, SOLUTION INTRAVENOUS CONTINUOUS
Status: DISCONTINUED | OUTPATIENT
Start: 2017-12-22 | End: 2017-12-22 | Stop reason: HOSPADM

## 2017-12-22 RX ORDER — PROPOFOL 10 MG/ML
INJECTION, EMULSION INTRAVENOUS AS NEEDED
Status: DISCONTINUED | OUTPATIENT
Start: 2017-12-22 | End: 2017-12-22 | Stop reason: HOSPADM

## 2017-12-22 RX ORDER — LIDOCAINE HYDROCHLORIDE 10 MG/ML
0.1 INJECTION, SOLUTION EPIDURAL; INFILTRATION; INTRACAUDAL; PERINEURAL AS NEEDED
Status: DISCONTINUED | OUTPATIENT
Start: 2017-12-22 | End: 2017-12-22 | Stop reason: HOSPADM

## 2017-12-22 RX ORDER — MIDAZOLAM HYDROCHLORIDE 1 MG/ML
0.5 INJECTION, SOLUTION INTRAMUSCULAR; INTRAVENOUS
Status: DISCONTINUED | OUTPATIENT
Start: 2017-12-22 | End: 2017-12-22 | Stop reason: HOSPADM

## 2017-12-22 RX ORDER — VANCOMYCIN/0.9 % SOD CHLORIDE 1.5G/250ML
1500 PLASTIC BAG, INJECTION (ML) INTRAVENOUS ONCE
Status: COMPLETED | OUTPATIENT
Start: 2017-12-22 | End: 2017-12-22

## 2017-12-22 RX ORDER — DEXAMETHASONE SODIUM PHOSPHATE 4 MG/ML
INJECTION, SOLUTION INTRA-ARTICULAR; INTRALESIONAL; INTRAMUSCULAR; INTRAVENOUS; SOFT TISSUE AS NEEDED
Status: DISCONTINUED | OUTPATIENT
Start: 2017-12-22 | End: 2017-12-22 | Stop reason: HOSPADM

## 2017-12-22 RX ORDER — MIDAZOLAM HYDROCHLORIDE 1 MG/ML
INJECTION, SOLUTION INTRAMUSCULAR; INTRAVENOUS AS NEEDED
Status: DISCONTINUED | OUTPATIENT
Start: 2017-12-22 | End: 2017-12-22 | Stop reason: HOSPADM

## 2017-12-22 RX ORDER — DOCUSATE SODIUM 100 MG/1
100 CAPSULE, LIQUID FILLED ORAL 2 TIMES DAILY
Qty: 60 CAP | Refills: 0 | Status: SHIPPED | OUTPATIENT
Start: 2017-12-22 | End: 2018-03-06

## 2017-12-22 RX ORDER — DIPHENHYDRAMINE HYDROCHLORIDE 50 MG/ML
12.5 INJECTION, SOLUTION INTRAMUSCULAR; INTRAVENOUS AS NEEDED
Status: DISCONTINUED | OUTPATIENT
Start: 2017-12-22 | End: 2017-12-22 | Stop reason: HOSPADM

## 2017-12-22 RX ORDER — FENTANYL CITRATE 50 UG/ML
INJECTION, SOLUTION INTRAMUSCULAR; INTRAVENOUS AS NEEDED
Status: DISCONTINUED | OUTPATIENT
Start: 2017-12-22 | End: 2017-12-22 | Stop reason: HOSPADM

## 2017-12-22 RX ORDER — PHENYLEPHRINE HCL IN 0.9% NACL 0.4MG/10ML
SYRINGE (ML) INTRAVENOUS AS NEEDED
Status: DISCONTINUED | OUTPATIENT
Start: 2017-12-22 | End: 2017-12-22 | Stop reason: HOSPADM

## 2017-12-22 RX ORDER — ONDANSETRON 2 MG/ML
INJECTION INTRAMUSCULAR; INTRAVENOUS AS NEEDED
Status: DISCONTINUED | OUTPATIENT
Start: 2017-12-22 | End: 2017-12-22 | Stop reason: HOSPADM

## 2017-12-22 RX ORDER — ONDANSETRON 2 MG/ML
4 INJECTION INTRAMUSCULAR; INTRAVENOUS AS NEEDED
Status: DISCONTINUED | OUTPATIENT
Start: 2017-12-22 | End: 2017-12-22 | Stop reason: HOSPADM

## 2017-12-22 RX ORDER — SODIUM CHLORIDE, SODIUM LACTATE, POTASSIUM CHLORIDE, CALCIUM CHLORIDE 600; 310; 30; 20 MG/100ML; MG/100ML; MG/100ML; MG/100ML
100 INJECTION, SOLUTION INTRAVENOUS CONTINUOUS
Status: DISCONTINUED | OUTPATIENT
Start: 2017-12-22 | End: 2017-12-22 | Stop reason: HOSPADM

## 2017-12-22 RX ORDER — TRAMADOL HYDROCHLORIDE 50 MG/1
50-100 TABLET ORAL
Qty: 40 TAB | Refills: 0 | Status: SHIPPED | OUTPATIENT
Start: 2017-12-22 | End: 2018-01-02 | Stop reason: SDUPTHER

## 2017-12-22 RX ORDER — HYDROMORPHONE HYDROCHLORIDE 2 MG/ML
0.5 INJECTION, SOLUTION INTRAMUSCULAR; INTRAVENOUS; SUBCUTANEOUS
Status: DISCONTINUED | OUTPATIENT
Start: 2017-12-22 | End: 2017-12-22 | Stop reason: HOSPADM

## 2017-12-22 RX ORDER — FENTANYL CITRATE 50 UG/ML
INJECTION, SOLUTION INTRAMUSCULAR; INTRAVENOUS AS NEEDED
Status: DISCONTINUED | OUTPATIENT
Start: 2017-12-22 | End: 2017-12-22

## 2017-12-22 RX ORDER — FENTANYL CITRATE 50 UG/ML
50 INJECTION, SOLUTION INTRAMUSCULAR; INTRAVENOUS AS NEEDED
Status: DISCONTINUED | OUTPATIENT
Start: 2017-12-22 | End: 2017-12-22 | Stop reason: HOSPADM

## 2017-12-22 RX ADMIN — SODIUM CHLORIDE, POTASSIUM CHLORIDE, SODIUM LACTATE AND CALCIUM CHLORIDE: 600; 310; 30; 20 INJECTION, SOLUTION INTRAVENOUS at 08:45

## 2017-12-22 RX ADMIN — VANCOMYCIN HYDROCHLORIDE 1 G: 10 INJECTION, POWDER, LYOPHILIZED, FOR SOLUTION INTRAVENOUS at 09:00

## 2017-12-22 RX ADMIN — MIDAZOLAM HYDROCHLORIDE 2 MG: 1 INJECTION, SOLUTION INTRAMUSCULAR; INTRAVENOUS at 09:00

## 2017-12-22 RX ADMIN — DEXAMETHASONE SODIUM PHOSPHATE 10 MG: 4 INJECTION, SOLUTION INTRA-ARTICULAR; INTRALESIONAL; INTRAMUSCULAR; INTRAVENOUS; SOFT TISSUE at 10:02

## 2017-12-22 RX ADMIN — PROPOFOL 130 MG: 10 INJECTION, EMULSION INTRAVENOUS at 09:26

## 2017-12-22 RX ADMIN — Medication 80 MCG: at 09:35

## 2017-12-22 RX ADMIN — FENTANYL CITRATE 50 MCG: 50 INJECTION, SOLUTION INTRAMUSCULAR; INTRAVENOUS at 09:26

## 2017-12-22 RX ADMIN — Medication 120 MCG: at 09:33

## 2017-12-22 RX ADMIN — FENTANYL CITRATE 25 MCG: 50 INJECTION, SOLUTION INTRAMUSCULAR; INTRAVENOUS at 10:55

## 2017-12-22 RX ADMIN — ONDANSETRON 4 MG: 2 INJECTION INTRAMUSCULAR; INTRAVENOUS at 10:02

## 2017-12-22 RX ADMIN — SUCCINYLCHOLINE CHLORIDE 140 MG: 20 INJECTION INTRAMUSCULAR; INTRAVENOUS at 09:26

## 2017-12-22 RX ADMIN — ALBUTEROL SULFATE 6 PUFF: 90 AEROSOL, METERED RESPIRATORY (INHALATION) at 09:29

## 2017-12-22 RX ADMIN — ALBUTEROL SULFATE 6 PUFF: 90 AEROSOL, METERED RESPIRATORY (INHALATION) at 10:21

## 2017-12-22 RX ADMIN — Medication 80 MCG: at 09:37

## 2017-12-22 RX ADMIN — FENTANYL CITRATE 50 MCG: 50 INJECTION, SOLUTION INTRAMUSCULAR; INTRAVENOUS at 09:51

## 2017-12-22 NOTE — PERIOP NOTES
[address] and updated family. Allowed time for questions and answers. [address] and updated family to notify that we are slowly weaning 02 requirements as tolerated. Allowed time for questions and answers. [phone] Provided patient with an Incentive Spirometer and educated on use and purpose. Demonstrated back and achieved a level of 750-1000. Remains on room air with sat's hanging at 88-89% at times decreasing to 86% and increasing to 93%. Dr. Ej Londono updated. 1202 Sat's now maintaining at 85%. Placed on 2L NC with sat's increasing up to 90%. Denies any SOB or Dyspnea. 36 Spoke with Dr. Ej Londono regarding patient's sat's now steady at 89-89%. Patient continues to deny any SOB or Dyspnea. \"I just want to go home. I feel fine. \"  According to CRNA Sat's prior to surgery were 88-89% on room air. Per Dr. Jaspreet Wallace to send home if sat's remain 88-89% or > on room air. 6355 TRANSFER - OUT REPORT:    Verbal report given to Chencho Rodriguez RN(name) on [facility]  being transferred to Phase II(unit) for routine post - op       Report consisted of patients Situation, Background, Assessment and   Recommendations(SBAR). Information from the following report(s) SBAR, Kardex, OR Summary, Procedure Summary, Intake/Output, MAR, Accordion, Recent Results, Med Rec Status, Cardiac Rhythm NSR and Alarm Parameters  was reviewed with the receiving nurse. Opportunity for questions and clarification was provided.       Patient transported with:   Registered Nurse

## 2017-12-22 NOTE — H&P (VIEW-ONLY)
To: Dr. Sudha Ponce  CC:  Luca Arce MD, Wily Gonzalez MD    From: Kavin Loredo MD    Thank you for sending Salina Hough to see us. Encounter Date: 12/4/2017  History and Physical    Assessment:   Known stage IV non-small cell lung cancer. PET avid left supraclavicular mass. Plan/Recommendations/Medical Decision Making:   Need for molecular testing to guide therapy. FNA did not provide enough tissue. Will do core biopsies of the left supraclavicular mass today. Risks and benefits discussed. She wishes to proceed. HPI:   Patient is a 79 y.o. female who is seen in consultation at the request of Dr. Sudha Ponce. She has been diagnosis via FNA with NSCLC. Appears to have primary in the MYESHA and PET shows mediastinal LAD, bony mets and a PET avid mass/LAD in the left supraclavicular region. She has seen Dr. Scott Ragsdale who is planning treatment, but need additional tissue for molecular testing.      Past Medical History:   Diagnosis Date    Arthritis     hands    Back pain     Cancer ([address]     scalp on back of head    Chronic obstructive pulmonary disease (HCC)     Diverticulitis     Hypercholesterolemia     Hypertension     IBS (irritable bowel syndrome)     Menopause 1982    Muscle ache       Past Surgical History:   Procedure Laterality Date    [address] [facility] [address]  [address]    umbilical    HX HYSTERECTOMY  1982    HX OOPHORECTOMY Left 1982    HX OOPHORECTOMY Right 1983    HX OTHER [address]    D&C    [address]    partial hysterectomy    HX OTHER SURGICAL  1982    hysterectomy    HX OTHER SURGICAL  1982    breast implants, [address]    [address] OTHER SURGICAL  2011    implant removal, [place] Bilateral [address]324    REMOVAL OF BREAST IMPLANT Bilateral 1989     Social History   Substance Use Topics    Smoking status: Former Smoker     Packs/day: 1.00     Years: 17.00     Quit date: 4/2/1986    Smokeless tobacco: Never Used    Alcohol use No      Family History   Problem Relation Age of Onset    Family history unknown: Yes       Current Outpatient Prescriptions   Medication Sig    potassium chloride (KLOR-CON M20) 20 mEq tablet Take 20 mEq by mouth daily.  fluticasone-salmeterol (ADVAIR DISKUS) 250-50 mcg/dose diskus inhaler Take 1 Puff by inhalation two (2) times a day.  ezetimibe (ZETIA) 10 mg tablet Take  by mouth.  fenofibrate (TRICOR) 160 mg tablet Take 160 mg by mouth daily.  triamterene-hydrochlorothiazide (DYAZIDE) 37.5-25 mg per capsule Take  by mouth daily.  budesonide-formoterol (SYMBICORT) 160-4.5 mcg/actuation HFA inhaler Take 2 Puffs by inhalation two (2) times a day.  LACTOBACILLUS ACIDOPHILUS (PROBIOTIC PO) Take  by mouth. 1 daily    MULTIVITAMIN PO Take  by mouth.  albuterol (PROVENTIL VENTOLIN) 2.5 mg /3 mL (0.083 %) nebulizer solution by Nebulization route once. Only for emergencies or if an infection sets in    aspirin delayed-release 81 mg tablet Take  by mouth daily. No current facility-administered medications for this visit. Allergies: Allergies   Allergen Reactions    Demerol [Meperidine] Nausea and Vomiting    Doxycycline Other (comments)     Female infection    Erythromycin Other (comments)     Female infection    Hydrocodone Itching     Dizzy, Headache    Penicillins Other (comments)     From childhood    Percodan [Oxycodone Hcl-Oxycodone-Asa] Nausea and Vomiting     headache    Sulfa (Sulfonamide Antibiotics) Swelling    Tetracycline Other (comments)     Female infection    Xylocaine [Lidocaine Hcl] Nausea Only     headache        Review of Systems:  10 systems reviewed. See scanned sheet in \"Media\" section. See HPI for pertinent positives and negatives.       Objective:     Visit Vitals    /83 (BP 1 Location: Left arm, BP Patient Position: Sitting)    Pulse 99    Resp 18    Ht 5' 5\" (1.651 m)    Wt 71.9 kg (158 lb 9.6 oz)    SpO2 97%    BMI 26.39 kg/m2     General appearance  Alert, cooperative, no distress, appears stated age   HEENT  Anicteric, palpable shallow mass in the medial left supraclavicular area. No JVD. Neck Supple       Lungs   CTAB   Heart  Regular rate and rhythm. No murmur, rub or gallop   Abdomen   Soft. NT. No obvious masses. Extremities No CCE. Pulses 2+ right radial   Skin Skin color, texture, turgor normal.        Neurologic Without overt sensory or motor deficit         Encounter Date: 12/4/2017    Preoperative diagnosis: above  Postoperative diagnosis: same  Procedure: ultrasound-guided core needle biopsy of the left supraclavicular mass    Time out at performed by me (see consent form):  * Patient was identified by name and date of birth   * Agreement on procedure being performed was verified  * Risks and Benefits explained to the patient  * Procedure site verified and marked as necessary  * Patient was positioned for comfort  * Consent was signed and verified    Description of the procedure: After obtaining informed consent a time out was performed any the patient was taken to the procedure room and ultrasound was used to local localize the target. Betadine was then used to prepped the skin and 2% lidocaine with epinephrine was infiltrated at the planned site of needle insertion. A stab incisions was made with a blade. Using ultrasound guidance the core device was used to sample the mass. 6 cores were taken. No hematoma was noted afterwards. Steri-strips were applied and the procedure was well tolerated. Post-procedure pain scale: 1/10.     Brian Fontanez MD

## 2017-12-22 NOTE — ANESTHESIA PREPROCEDURE EVALUATION
Anesthetic History   No history of anesthetic complications            Review of Systems / Medical History  Patient summary reviewed, nursing notes reviewed and pertinent labs reviewed    Pulmonary    COPD        Asthma        Neuro/Psych             Comments: Muscle ache (729.1)       Back pain (724.5)  Cardiovascular    Hypertension              Exercise tolerance: >4 METS     GI/Hepatic/Renal  Within defined limits              Endo/Other        Obesity and arthritis     Other Findings              Physical Exam    Airway  Mallampati: II  TM Distance: > 6 cm  Neck ROM: normal range of motion   Mouth opening: Normal     Cardiovascular  Regular rate and rhythm,  S1 and S2 normal,  no murmur, click, rub, or gallop  Rhythm: regular  Rate: normal         Dental    Dentition: Caps/crowns     Pulmonary  Breath sounds clear to auscultation               Abdominal  GI exam deferred       Other Findings            Anesthetic Plan    ASA: 2  Anesthesia type: general and total IV anesthesia          Induction: Intravenous  Anesthetic plan and risks discussed with: Patient      Propofol MAC

## 2017-12-22 NOTE — INTERVAL H&P NOTE
H&P Update:  Tyrell Smith was seen and examined. History and physical has been reviewed. The patient has been examined.  There have been no significant clinical changes since the completion of the originally dated History and Physical.    Signed By: Minerva Pereira MD     December 22, 2017 8:53 AM

## 2017-12-22 NOTE — PERIOP NOTES
Patient taken to OR before Vancomycin could be scanned.   VAnc 1.5 gm in 250 ml normal saline was started at 0850 via pump

## 2017-12-22 NOTE — DISCHARGE INSTRUCTIONS
Discharge Instructions: [facility] -- Dr. Timmy Coombs    Call on next business day to arrange appointment for follow up in 3 weeks [phone]    Activity:  Walk regularly. No lifting more than 10 -15 pounds for 4 weeks. Light aerobic activity is okay when you feel up to it. You may resume driving in three days unless still requiring narcotics for pain. Return to work in 1-2 weeks but no heavy lifting for 4 weeks. Work:  You may return to work in 1 or 2 weeks to light activity. No lifting more than 10 pounds (=\"light duty\") for four weeks. If your employer can not accommodate \"light duty,\" your employer will need to provide any necessary paperwork to our office. This document with serve as the initial \"note\" to your employer. Diet:  You may resume normal diet. Wound Care:  Dermabond glue dressing will fall off on its own. If you experience a lot of drainage, develop redness around the wound, or a fever over 101 F occurs please call the office. You may shower. No baths or swimming for 3 weeks. Ice over incision 20 minutes every hour as needed. Place a cloth between the ice and skin. Medications:  See attached \"Medication Reconciliation. \"    Resume home medications as indicated on the Medical Reconciliation form. Do not use blood thinners (such as Aspirin, Coumadin, or Plavix) until 3 days after surgery. Pain medications:  Non steroidal antiinflammatories (ibuprofen -- Advil or Motrin) seem to work best for post surgical pain. Try these first.  A narcotic prescription will also be given for breakthrough pain. Colace or Miralax should be used twice daily to prevent constipation while on narcotics. If you are still having trouble having a BM after 1-2 days, try milk of magnesia. If this does not work within 24 hours, try a bottle of magnesium citrate. Narcotics and anesthesia sometimes cause nausea and vomiting. If persistent please call the office.     Do not hesitate to call with questions or concerns. If the chemotherapy center has any difficulty accessing you port, ask them to call me and I will help them. Jana Kline MD  Tel [phone]  Fax [phone]      A common side effect of anesthesia following surgery is nausea and/or vomiting. In order to decrease symptoms, it is wise to avoid foods that are high in fat, greasy foods, milk products, and spicy foods for the first 24 hours. Acceptable foods for the first 24 hours following surgery include but are not limited to:     soup   broth    toast    crackers    applesauce    bananas    mashed potatoes,   soft or scrambled eggs   oatmeal    jello    It is important to eat when taking your pain medication. This will help to prevent nausea. If possible, please try to time your meals with your medications. It is very important to stay hydrated following surgery. Sip fluids frequently while awake. Avoid acidic drinks such as citrus juices and soda for 24 hours. Carbonated beverages may cause bloating and gas. Acceptable fluids include:    - water (flavor packets may add variety)  - coffee or tea (in moderation)  - Gatorade  - Hector-aid  - apple juice  - cranberry juice    You are encouraged to cough and deep breathe every hour when awake. This will help to prevent respiratory complications following anesthesia. You may want to hug a pillow when coughing and sneezing to add additional support to the surgical area and to decrease discomfort if you had abdominal or chest surgery. If you are discharged home with support stockings, you may remove them after 24 hours. Support stockings are used to help prevent blood clots in the legs following surgery. Please take time to review all of your Home Care Instructions and Medication Information sheets provided in your discharge packet. If you have any questions, please contact your surgeons office. Thank you.                 How to Care for Your Wound After Its Treated With  DERMABOND* Topical Skin Adhesive  DERMABOND* Topical Skin Adhesive (2-octyl cyanoacrylate) is a sterile, liquid skin adhesive  that holds wound edges together. The film will usually remain in place for 5 to 10 days, then  naturally fall off your skin. The following will answer some of your questions and provide instructions for proper care for your  wound while it is healing:    CHECK WOUND APPEARANCE   Some swelling, redness, and pain are common with all wounds and normally will go away as the  wound heals. If swelling, redness, or pain increases or if the wound feels warm to the touch,  contact a doctor. Also contact a doctor if the wound edges reopen or separate. REPLACE BANDAGES   If your wound is bandaged, keep the bandage dry.  Replace the dressing daily until the adhesive film has fallen off or if the  bandage should become wet, unless otherwise instructed by your  physician.  When changing the dressing, do not place tape directly over the  DERMABOND adhesive film, because removing the tape later may also  remove the film. AVOID TOPICAL MEDICATIONS   Do not apply liquid or ointment medications or any other product to your wound while the  DERMABOND adhesive film is in place. These may loosen the film before your wound is healed. KEEP WOUND DRY AND PROTECTED   You may occasionally and briefly wet your wound in the shower or bath. Do not soak or scrub  your wound, do not swim, and avoid periods of heavy perspiration until the DERMABOND  adhesive has naturally fallen off. After showering or bathing, gently blot your wound dry with a  soft towel. If a protective dressing is being used, apply a fresh, dry bandage, being sure to keep  the tape off the DERMABOND adhesive film.  Apply a clean, dry bandage over the wound if necessary to protect it.    Protect your wound from injury until the skin has had sufficient time to heal.   Do not scratch, rub, or pick at the [facility] adhesive film. This may loosen the film before  your wound is healed.  Protect the wound from prolonged exposure to sunlight or tanning lamps while the film is in  place. If you have any questions or concerns about this product, please consult your doctor. *Trademark ©[employer] 2002            DISCHARGE SUMMARY from Nurse    PATIENT INSTRUCTIONS:    After general anesthesia or intravenous sedation, for 24 hours or while taking prescription Narcotics:  · Limit your activities  · Do not drive and operate hazardous machinery  · Do not make important personal or business decisions  · Do  not drink alcoholic beverages  · If you have not urinated within 8 hours after discharge, please contact your surgeon on call. Report the following to your surgeon:  · Excessive pain, swelling, redness or odor of or around the surgical area  · Temperature over 100.5  · Nausea and vomiting lasting longer than 4 hours or if unable to take medications  · Any signs of decreased circulation or nerve impairment to extremity: change in color, persistent  numbness, tingling, coldness or increase pain  · Any questions    What to do at Home:  Recommended activity: Activity as tolerated and no driving for today or while taking narcotic pain medications. If you experience any of the symptoms noted above, please follow up with Dr. Marlen Arce. *  Please give a list of your current medications to your Primary Care Provider. *  Please update this list whenever your medications are discontinued, doses are      changed, or new medications (including over-the-counter products) are added. *  Please carry medication information at all times in case of emergency situations. These are general instructions for a healthy lifestyle:    No smoking/ No tobacco products/ Avoid exposure to second hand smoke  Surgeon General's Warning:  Quitting smoking now greatly reduces serious risk to your health.     Obesity, smoking, and sedentary lifestyle greatly increases your risk for illness    A healthy diet, regular physical exercise & weight monitoring are important for maintaining a healthy lifestyle    You may be retaining fluid if you have a history of heart failure or if you experience any of the following symptoms:  Weight gain of 3 pounds or more overnight or 5 pounds in a week, increased swelling in our hands or feet or shortness of breath while lying flat in bed. Please call your doctor as soon as you notice any of these symptoms; do not wait until your next office visit. Recognize signs and symptoms of STROKE:    F-face looks uneven    A-arms unable to move or move unevenly    S-speech slurred or non-existent    T-time-call 911 as soon as signs and symptoms begin-DO NOT go       Back to bed or wait to see if you get better-TIME IS BRAIN. Warning Signs of HEART ATTACK     Call 911 if you have these symptoms:   Chest discomfort. Most heart attacks involve discomfort in the center of the chest that lasts more than a few minutes, or that goes away and comes back. It can feel like uncomfortable pressure, squeezing, fullness, or pain.  Discomfort in other areas of the upper body. Symptoms can include pain or discomfort in one or both arms, the back, neck, jaw, or stomach.  Shortness of breath with or without chest discomfort.  Other signs may include breaking out in a cold sweat, nausea, or lightheadedness. Don't wait more than five minutes to call 911 - MINUTES MATTER! Fast action can save your life. Calling 911 is almost always the fastest way to get lifesaving treatment. Emergency Medical Services staff can begin treatment when they arrive -- up to an hour sooner than if someone gets to the hospital by car. The discharge information has been reviewed with the patient and caregiver. The patient and caregiver verbalized understanding.   Discharge medications reviewed with the patient and caregiver and appropriate educational materials and side effects teaching were provided. ___________________________________________________________________________________________________________________________________         Tramadol (Ultram, Ultram ER, [place], Theratramadol-60) - (By mouth)   Why this medicine is used:   Treats moderate to severe pain. This medicine is a narcotic pain reliever. Contact a nurse or doctor right away if you have:  · Extreme weakness, shallow breathing  · Seizures  · Seeing or hearing things that are not there  · Anxiety, restlessness, muscle spasms, fever, sweating, diarrhea  · Slow or fast heartbeat     Common side effects:  · Nausea, vomiting, constipation  · Headache, drowsiness  · Itching, feeling of warmth, redness of the face, neck, arms, and upper chest  © 2017 [address] Information is for End User's use only and may not be sold, redistributed or otherwise used for commercial purposes. Laxative, Stimulant Combination (By mouth)   Treats constipation by helping you have a bowel movement. Brand Name(s): Colace, Doc-Q-Lax, Dok Plus, [facility] Stool Softener & Laxative, Good Sense Stimulant Laxative Plus, Laxacin, Medi-Laxx, Tatyana-Colace, [employer] Laxative and Stool Softener, [employer] P Col-Rite, Senexon-S, Senna-S, Senna-Time S, SennaLax-S, SennaPrompt   There may be other brand names for this medicine. When This Medicine Should Not Be Used: You should not use this medicine if you have had an allergic reaction to senna, sennosides, docusate, casanthranol, or psyllium. Some brand names for these medicines are Correctol® stool softener, Ex-Lax®, Colace®, or Metamucil®. Make sure your doctor knows if you are allergic to any other laxative medicines. How to Use This Medicine:   Tablet, Liquid Filled Capsule, Liquid, Granule, Capsule, Powder for Suspension  · Your doctor will tell you how much medicine to use. Do not use more than directed.   · If you have had a sudden change in your bowel movements in the past two weeks, ask your doctor before using this medicine. · Follow the instructions on the medicine label if you are using this medicine without a prescription. · Drink a full glass of water when you take this medicine. One full glass of water is about 8 ounces or 1 cup. Drinking 6 to 8 full glasses of water every day will help keep your bowel movements soft. · You might need to mix the granules or powder with water before you take each dose. Drink this mixture right away. Do not swallow the granules or powder dry unless the directions say you can. · Measure the oral liquid medicine with a marked measuring spoon, oral syringe, or medicine cup. · Use this medicine at bedtime, unless your doctor tells you otherwise. If a dose is missed:   · Take a dose as soon as you remember. If it is almost time for your next dose, wait until then and take a regular dose. Do not take extra medicine to make up for a missed dose. How to Store and Dispose of This Medicine:   · Store the medicine in a closed container at room temperature, away from heat, moisture, and direct light. · Ask your pharmacist, doctor, or health caregiver about the best way to dispose of any outdated medicine or medicine no longer needed. · Keep all medicine out of the reach of children. Never share your medicine with anyone. Drugs and Foods to Avoid:   Ask your doctor or pharmacist before using any other medicine, including over-the-counter medicines, vitamins, and herbal products. · Make sure your doctor knows if you are also using mineral oil. · Some laxatives need to be taken 2 hours before or 2 hours after other medicines. If you need to take any other medicine, ask your health caregiver if you need to follow a special schedule. Warnings While Using This Medicine:   · Make sure your doctor knows if you are pregnant or breast feeding.   · Do not use this medicine if you have stomach pain, nausea, or vomiting, unless your health caregiver tells you to use it. · If you do not have a bowel movement after using this medicine, talk to your doctor. Most people will have a bowel movement within 6 to 12 hours after using this laxative. · You should not use this laxative for more than 1 week unless your doctor says it is okay. Laxatives may be habit-forming and can harm your bowels if you use them too long. Possible Side Effects While Using This Medicine:   Call your doctor right away if you notice any of these side effects:  · Bleeding from your rectum. · Skin rash. · Urine turns a different color. If you notice these less serious side effects, talk with your doctor:   · Diarrhea, cramps, nausea, burping. If you notice other side effects that you think are caused by this medicine, tell your doctor. Call your doctor for medical advice about side effects. You may report side effects to FDA at 4-675-FDA-1370  © 2017 [address] Information is for End User's use only and may not be sold, redistributed or otherwise used for commercial purposes. The above information is an  only. It is not intended as medical advice for individual conditions or treatments. Talk to your doctor, nurse or pharmacist before following any medical regimen to see if it is safe and effective for you.

## 2017-12-22 NOTE — PERIOP NOTES
Handoff Report from Operating Room to PACU    Report received from EARL Fortune CRNA and Skyla Shearer RN regarding Keshav Torres. Surgeon(s):  Portia Stockton MD  And Procedure(s) (LRB):  PORTACATH INSERTION (Right)  confirmed   with allergies and dressings discussed. Anesthesia type, drugs, patient history, complications, estimated blood loss, vital signs, intake and output, and last pain medication, lines, reversal medications and temperature were reviewed.

## 2017-12-22 NOTE — ANESTHESIA POSTPROCEDURE EVALUATION
Post-Anesthesia Evaluation and Assessment    Patient: Clare Bond MRN: [id number]  SSN: [id number]    YOB: 1950  Age: 79 y.o. Sex: female       Cardiovascular Function/Vital Signs  Visit Vitals    /61 (BP 1 Location: Left arm, BP Patient Position: At rest)    Pulse 98    Temp 36.8 °C (98.2 °F)    Resp 21    Wt 69.9 kg (154 lb 1.6 oz)    SpO2 94%    BMI 24.87 kg/m2       Patient is status post general, total IV anesthesia anesthesia for Procedure(s):  PORTACATH INSERTION. Nausea/Vomiting: None    Postoperative hydration reviewed and adequate. Pain:  Pain Scale 1: Numeric (0 - 10) (12/22/17 1115)  Pain Intensity 1: 2 (12/22/17 1115)   Managed    Neurological Status:   Neuro (WDL): Exceptions to WDL (12/22/17 1037)  Neuro  Neurologic State: Drowsy; Eyes open spontaneously (12/22/17 1037)  Orientation Level: Oriented to person (12/22/17 1037)  Cognition: Follows commands (12/22/17 1037)  Speech: Delayed responses (12/22/17 1037)  LUE Motor Response: Purposeful;Spontaneous  (12/22/17 1037)  LLE Motor Response: Purposeful;Spontaneous  (12/22/17 1037)  RUE Motor Response: Purposeful;Spontaneous ;Weak (12/22/17 1037)  RLE Motor Response: Purposeful;Spontaneous  (12/22/17 1037)   At baseline    Mental Status and Level of Consciousness: Arousable    Pulmonary Status:   O2 Device: Room air (12/22/17 1115)   Adequate oxygenation and airway patent    Complications related to anesthesia: None    Post-anesthesia assessment completed.  No concerns    Signed By: Cassi Cid MD     December 22, 2017

## 2017-12-22 NOTE — IP AVS SNAPSHOT
[address] [facility] 
[phone] Patient: Yen Almendarez MRN: [id number] About your hospitalization You were admitted on:  December 22, 2017 You last received care in the:  [facility] [place] PACU You were discharged on:  December 22, 2017 Why you were hospitalized Your primary diagnosis was:  Not on File Things You Need To Do (next 8 weeks) Follow up with Kimberlee Bedoya MD  
  
Phone:  [phone] Where:  [facility], [address] [address] Thursday Feb 15, 2018 MRI BRAIN W WO CONT with [facility] MRI 2 at 12:15 PM  
1. Please bring a list or a bag of your current medications to your appointment 2. Please be sure to remove ALL hair clips, pins, extensions, etc., prior to arriving for your MRI procedure. 3. If you have any medical implants or devices, please bring associated medical card with you. 4. Bring any non [facility] films or CDs pertaining to the area being imaged with you on the day of appointment. 5. A written order with a valid diagnosis and Physicians  signature is required for all scheduled tests. 6. Check in at registration 30min before your appointment time unless you were instructed to do otherwise. Where:  [facility] MRI Department ([address]) Discharge Orders None A check zana indicates which time of day the medication should be taken. My Medications TAKE these medications as instructed Instructions Each Dose to Equal  
 Morning Noon Evening Bedtime ADVAIR DISKUS 250-50 mcg/dose diskus inhaler Generic drug:  fluticasone-salmeterol Your last dose was: Your next dose is: Take 1 Puff by inhalation two (2) times a day. 1 Puff  
    
   
   
   
  
 albuterol 2.5 mg /3 mL (0.083 %) nebulizer solution Commonly known as:  PROVENTIL VENTOLIN Your last dose was: Your next dose is:    
   
   
 by Nebulization route once. Only for emergencies or if an infection sets in ALBUTEROL IN Your last dose was: Your next dose is: Take  by inhalation as needed. ALIGN PO Your last dose was: Your next dose is: Take  by mouth daily. aspirin delayed-release 81 mg tablet Your last dose was: Your next dose is: Take  by mouth daily. benzonatate 100 mg capsule Commonly known as:  TESSALON Your last dose was: Your next dose is: Take 100 mg by mouth as needed for Cough. 100 mg  
    
   
   
   
  
 BIOTIN PO Your last dose was: Your next dose is: Take  by mouth daily. docusate sodium 100 mg capsule Commonly known as:  Pam Bunkers Your last dose was: Your next dose is: Take 1 Cap by mouth two (2) times a day. May use OTC instead. Prevents constipation from narcotics. 100 mg  
    
   
   
   
  
 fenofibrate 160 mg tablet Commonly known as:  LOFIBRA Your last dose was: Your next dose is: Take 160 mg by mouth daily. 160 mg  
    
   
   
   
  
 folic acid 1 mg tablet Commonly known as:  Google Your last dose was: Your next dose is: Take 1 mg by mouth daily. 1 mg MULTIVITAMIN PO Your last dose was: Your next dose is: Take  by mouth. PERIDEX 0.12 % solution Generic drug:  chlorhexidine Your last dose was: Your next dose is:    
   
   
 15 mL by Swish and Spit route as needed. 15 mL POTASSIUM CHLORIDE PO Your last dose was: Your next dose is: Take 10 mEq by mouth every morning. 10 mEq SYMBICORT 160-4.5 mcg/actuation Hfaa Generic drug:  budesonide-formoterol Your last dose was: Your next dose is: Take 2 Puffs by inhalation as needed. 2 Puff * traMADol 50 mg tablet Commonly known as:  ULTRAM  
   
Your last dose was: Your next dose is: Take 50 mg by mouth as needed for Pain. 50 mg  
    
   
   
   
  
 * traMADol 50 mg tablet Commonly known as:  ULTRAM  
   
Your last dose was: Your next dose is: Take 1-2 Tabs by mouth every six (6) hours as needed for Pain. Max Daily Amount: 400 mg.  
  mg  
    
   
   
   
  
 triamterene-hydroCHLOROthiazide 37.5-25 mg per capsule Commonly known as:  Osorio Bogus Your last dose was: Your next dose is: Take  by mouth daily. ZETIA 10 mg tablet Generic drug:  ezetimibe Your last dose was: Your next dose is: Take  by mouth. * Notice: This list has 2 medication(s) that are the same as other medications prescribed for you. Read the directions carefully, and ask your doctor or other care provider to review them with you. Where to Get Your Medications These medications were sent to [address] [facility] [address]  [address]  [address] [facility], [address] [place] [id number] Phone:  [phone]  
  docusate sodium 100 mg capsule Information on where to get these meds will be given to you by the nurse or doctor. ! Ask your nurse or doctor about these medications  
  traMADol 50 mg tablet Discharge Instructions Discharge Instructions: Charles -- Dr. Macarena Mauricio Call on next business day to arrange appointment for follow up in 3 weeks [phone] Activity: 
Walk regularly. No lifting more than 10 -15 pounds for 4 weeks.   Light aerobic activity is okay when you feel up to it. You may resume driving in three days unless still requiring narcotics for pain. Return to work in 1-2 weeks but no heavy lifting for 4 weeks. Work: 
You may return to work in 1 or 2 weeks to light activity. No lifting more than 10 pounds (=\"light duty\") for four weeks. If your employer can not accommodate \"light duty,\" your employer will need to provide any necessary paperwork to our office. This document with serve as the initial \"note\" to your employer. Diet: 
You may resume normal diet. Wound Care: 
Dermabond glue dressing will fall off on its own. If you experience a lot of drainage, develop redness around the wound, or a fever over 101 F occurs please call the office. You may shower. No baths or swimming for 3 weeks. Ice over incision 20 minutes every hour as needed. Place a cloth between the ice and skin. Medications: 
See attached \"Medication Reconciliation. \" 
 
Resume home medications as indicated on the Medical Reconciliation form. Do not use blood thinners (such as Aspirin, Coumadin, or Plavix) until 3 days after surgery. Pain medications:  Non steroidal antiinflammatories (ibuprofen -- Advil or Motrin) seem to work best for post surgical pain. Try these first.  A narcotic prescription will also be given for breakthrough pain. Colace or Miralax should be used twice daily to prevent constipation while on narcotics. If you are still having trouble having a BM after 1-2 days, try milk of magnesia. If this does not work within 24 hours, try a bottle of magnesium citrate. Narcotics and anesthesia sometimes cause nausea and vomiting. If persistent please call the office. Do not hesitate to call with questions or concerns. If the chemotherapy center has any difficulty accessing you port, ask them to call me and I will help them. Afshan Oglesby MD 
Tel [phone] Fax [phone] A common side effect of anesthesia following surgery is nausea and/or vomiting. In order to decrease symptoms, it is wise to avoid foods that are high in fat, greasy foods, milk products, and spicy foods for the first 24 hours. Acceptable foods for the first 24 hours following surgery include but are not limited to: 
 
? soup 
? broth 
?  toast  
? crackers ? applesauce 
? bananas  
? mashed potatoes, 
? soft or scrambled eggs 
? oatmeal 
?  jello It is important to eat when taking your pain medication. This will help to prevent nausea. If possible, please try to time your meals with your medications. It is very important to stay hydrated following surgery. Sip fluids frequently while awake. Avoid acidic drinks such as citrus juices and soda for 24 hours. Carbonated beverages may cause bloating and gas. Acceptable fluids include: 
 
? water (flavor packets may add variety) ? coffee or tea (in moderation) ? Gatorade ? [address] ? apple juice 
? cranberry juice You are encouraged to cough and deep breathe every hour when awake. This will help to prevent respiratory complications following anesthesia. You may want to hug a pillow when coughing and sneezing to add additional support to the surgical area and to decrease discomfort if you had abdominal or chest surgery. If you are discharged home with support stockings, you may remove them after 24 hours. Support stockings are used to help prevent blood clots in the legs following surgery. Please take time to review all of your Home Care Instructions and Medication Information sheets provided in your discharge packet. If you have any questions, please contact your surgeons office. Thank you. How to Care for Your Wound After Its Treated With DERMABOND* Topical Skin Adhesive DERMABOND* Topical Skin Adhesive (2-octyl cyanoacrylate) is a sterile, liquid skin adhesive that holds wound edges together. The film will usually remain in place for 5 to 10 days, then 
naturally fall off your skin. The following will answer some of your questions and provide instructions for proper care for your 
wound while it is healing: CHECK WOUND APPEARANCE 
 Some swelling, redness, and pain are common with all wounds and normally will go away as the 
wound heals. If swelling, redness, or pain increases or if the wound feels warm to the touch, 
contact a doctor. Also contact a doctor if the wound edges reopen or separate. REPLACE BANDAGES 
 If your wound is bandaged, keep the bandage dry.  Replace the dressing daily until the adhesive film has fallen off or if the 
bandage should become wet, unless otherwise instructed by your 
physician.  When changing the dressing, do not place tape directly over the DERMABOND adhesive film, because removing the tape later may also 
remove the film. AVOID TOPICAL MEDICATIONS  Do not apply liquid or ointment medications or any other product to your wound while the DERMABOND adhesive film is in place. These may loosen the film before your wound is healed. KEEP WOUND DRY AND PROTECTED  You may occasionally and briefly wet your wound in the shower or bath. Do not soak or scrub 
your wound, do not swim, and avoid periods of heavy perspiration until the DERMABOND 
adhesive has naturally fallen off. After showering or bathing, gently blot your wound dry with a 
soft towel. If a protective dressing is being used, apply a fresh, dry bandage, being sure to keep 
the tape off the DERMABOND adhesive film.  Apply a clean, dry bandage over the wound if necessary to protect it.  Protect your wound from injury until the skin has had sufficient time to heal. 
 Do not scratch, rub, or pick at the DERMABOND adhesive film. This may loosen the film before 
your wound is healed.  
 Protect the wound from prolonged exposure to sunlight or tanning lamps while the film is in 
place. If you have any questions or concerns about this product, please consult your doctor. *Trademark ©[employer] 2002 DISCHARGE SUMMARY from Nurse PATIENT INSTRUCTIONS: 
 
 
F-face looks uneven A-arms unable to move or move unevenly S-speech slurred or non-existent T-time-call 911 as soon as signs and symptoms begin-DO NOT go Back to bed or wait to see if you get better-TIME IS BRAIN. Warning Signs of HEART ATTACK Call 911 if you have these symptoms: 
? Chest discomfort. Most heart attacks involve discomfort in the center of the chest that lasts more than a few minutes, or that goes away and comes back. It can feel like uncomfortable pressure, squeezing, fullness, or pain. ? Discomfort in other areas of the upper body. Symptoms can include pain or discomfort in one or both arms, the back, neck, jaw, or stomach. ? Shortness of breath with or without chest discomfort. ? Other signs may include breaking out in a cold sweat, nausea, or lightheadedness. Don't wait more than five minutes to call [address]! Fast action can save your life. Calling 911 is almost always the fastest way to get lifesaving treatment. Emergency Medical Services staff can begin treatment when they arrive  up to an hour sooner than if someone gets to the hospital by car. The discharge information has been reviewed with the patient and caregiver. The patient and caregiver verbalized understanding. Discharge medications reviewed with the patient and caregiver and appropriate educational materials and side effects teaching were provided. ___________________________________________________________________________________________________________________________________ Tramadol (Ultram, Ultram ER, [place], Theratramadol-60) - (By mouth) Why this medicine is used:  
Treats moderate to severe pain. This medicine is a narcotic pain reliever. Contact a nurse or doctor right away if you have: 
· Extreme weakness, shallow breathing · Seizures · Seeing or hearing things that are not there · Anxiety, restlessness, muscle spasms, fever, sweating, diarrhea · Slow or fast heartbeat Common side effects: 
· Nausea, vomiting, constipation · Headache, drowsiness · Itching, feeling of warmth, redness of the face, neck, arms, and upper chest 
© 2017 [address] Information is for End User's use only and may not be sold, redistributed or otherwise used for commercial purposes. Laxative, Stimulant Combination (By mouth) Treats constipation by helping you have a bowel movement. Brand Name(s): Colace, Doc-Q-Lax, Dok Plus, [facility] Stool Softener & Laxative, Good Sense Stimulant Laxative Plus, Laxacin, Medi-Laxx, Tatyana-Colace, [employer] Laxative and Stool Softener, [employer] P Col-Rite, Senexon-S, Senna-S, Senna-Time S, SennaLax-S, SennaPrompt There may be other brand names for this medicine. When This Medicine Should Not Be Used: You should not use this medicine if you have had an allergic reaction to senna, sennosides, docusate, casanthranol, or psyllium. Some brand names for these medicines are Correctol® stool softener, Ex-Lax®, Colace®, or Metamucil®. Make sure your doctor knows if you are allergic to any other laxative medicines. How to Use This Medicine:  
Tablet, Liquid Filled Capsule, Liquid, Granule, Capsule, Powder for Suspension · Your doctor will tell you how much medicine to use. Do not use more than directed.  
· If you have had a sudden change in your bowel movements in the past two weeks, ask your doctor before using this medicine. · Follow the instructions on the medicine label if you are using this medicine without a prescription. · Drink a full glass of water when you take this medicine. One full glass of water is about 8 ounces or 1 cup. Drinking 6 to 8 full glasses of water every day will help keep your bowel movements soft. · You might need to mix the granules or powder with water before you take each dose. Drink this mixture right away. Do not swallow the granules or powder dry unless the directions say you can. · Measure the oral liquid medicine with a marked measuring spoon, oral syringe, or medicine cup. · Use this medicine at bedtime, unless your doctor tells you otherwise. If a dose is missed: · Take a dose as soon as you remember. If it is almost time for your next dose, wait until then and take a regular dose. Do not take extra medicine to make up for a missed dose. How to Store and Dispose of This Medicine: · Store the medicine in a closed container at room temperature, away from heat, moisture, and direct light. · Ask your pharmacist, doctor, or health caregiver about the best way to dispose of any outdated medicine or medicine no longer needed. · Keep all medicine out of the reach of children. Never share your medicine with anyone. Drugs and Foods to Avoid: Ask your doctor or pharmacist before using any other medicine, including over-the-counter medicines, vitamins, and herbal products. · Make sure your doctor knows if you are also using mineral oil. · Some laxatives need to be taken 2 hours before or 2 hours after other medicines. If you need to take any other medicine, ask your health caregiver if you need to follow a special schedule. Warnings While Using This Medicine: · Make sure your doctor knows if you are pregnant or breast feeding.  
· Do not use this medicine if you have stomach pain, nausea, or vomiting, unless your health caregiver tells you to use it. · If you do not have a bowel movement after using this medicine, talk to your doctor. Most people will have a bowel movement within 6 to 12 hours after using this laxative. · You should not use this laxative for more than 1 week unless your doctor says it is okay. Laxatives may be habit-forming and can harm your bowels if you use them too long. Possible Side Effects While Using This Medicine:  
Call your doctor right away if you notice any of these side effects: · Bleeding from your rectum. · Skin rash. · Urine turns a different color. If you notice these less serious side effects, talk with your doctor: · Diarrhea, cramps, nausea, burping. If you notice other side effects that you think are caused by this medicine, tell your doctor. Call your doctor for medical advice about side effects. You may report side effects to FDA at 4-902-FDA-7052 © 2017 [address] Information is for End User's use only and may not be sold, redistributed or otherwise used for commercial purposes. The above information is an  only. It is not intended as medical advice for individual conditions or treatments. Talk to your doctor, nurse or pharmacist before following any medical regimen to see if it is safe and effective for you. Introducing [address]! Dear Michael Monzon: Thank you for requesting a [employer] account. Our records indicate that you already have an active [employer] account. You can access your account anytime at [email or web address] Did you know that you can access your hospital and ER discharge instructions at any time in [employer]? You can also review all of your test results from your hospital stay or ER visit. Additional Information If you have questions, please visit the Frequently Asked Questions section of the [employer] website at [email or web address] Remember, MyChart is NOT to be used for urgent needs. For medical emergencies, dial 911. Now available from your iPhone and Android! Unresulted Labs-Please follow up with your PCP about these lab tests Order Current Status CULTURE, URINE In process XR CHEST PORT In process XR FLUOROSCOPY UNDER 60 MINUTES In process Providers Seen During Your Hospitalization Provider Specialty Primary office phone Evette Reis MD General Surgery [phone] Your Primary Care Physician (PCP) Primary Care Physician Office Phone Office Fax Kasey Mcgee [phone] You are allergic to the following Allergen Reactions Demerol (Meperidine) Nausea and Vomiting Doxycycline Other (comments) Female infection Erythromycin Other (comments) Female infection Hydrocodone Itching Dizzy, Headache Penicillins Other (comments) From childhood Percodan (Oxycodone Hcl-Oxycodone-Asa) Nausea and Vomiting  
 headache  
    
 Sulfa (Sulfonamide Antibiotics) Swelling Tetracycline Other (comments) Female infection Xylocaine (Lidocaine Hcl) Nausea Only  
 headache Recent Documentation Weight BMI OB Status Smoking Status 69.9 kg 24.87 kg/m2 Hysterectomy Former Smoker Emergency Contacts Name Discharge Info Relation Home Work Mobile Pool,Jonh DISCHARGE CAREGIVER [3] Spouse [3] [phone] Patient Belongings The following personal items are in your possession at time of discharge: 
  Dental Appliances: None         Home Medications: None   Jewelry: None  Clothing: Footwear, Shirt, Pants, Jacket/Coat, Socks, Undergarments    Other Valuables: None Please provide this summary of care documentation to your next provider.  
  
  
 
  
Signatures-by signing, you are acknowledging that this After Visit Summary has been reviewed with you and you have received a copy. Patient Signature:  ____________________________________________________________ Date:  ____________________________________________________________  
  
Hurshel Och Provider Signature:  ____________________________________________________________ Date:  ____________________________________________________________

## 2017-12-23 LAB
BACTERIA SPEC CULT: NORMAL
CC UR VC: NORMAL
SERVICE CMNT-IMP: NORMAL

## 2018-01-02 ENCOUNTER — APPOINTMENT (OUTPATIENT)
Dept: GENERAL RADIOLOGY | Age: 68
DRG: 543 | End: 2018-01-02
Attending: EMERGENCY MEDICINE
Payer: MEDICARE

## 2018-01-02 ENCOUNTER — APPOINTMENT (OUTPATIENT)
Dept: CT IMAGING | Age: 68
DRG: 543 | End: 2018-01-02
Attending: EMERGENCY MEDICINE
Payer: MEDICARE

## 2018-01-02 ENCOUNTER — HOSPITAL ENCOUNTER (INPATIENT)
Age: 68
LOS: 2 days | Discharge: HOME | DRG: 543 | End: 2018-01-04
Attending: EMERGENCY MEDICINE | Admitting: INTERNAL MEDICINE
Payer: MEDICARE

## 2018-01-02 DIAGNOSIS — R63.4 WEIGHT LOSS: ICD-10-CM

## 2018-01-02 DIAGNOSIS — R06.02 SOB (SHORTNESS OF BREATH): ICD-10-CM

## 2018-01-02 DIAGNOSIS — C34.90 PRIMARY MALIGNANT NEOPLASM OF LUNG METASTATIC TO OTHER SITE, UNSPECIFIED LATERALITY (HCC): Primary | ICD-10-CM

## 2018-01-02 DIAGNOSIS — R06.02 SHORTNESS OF BREATH: ICD-10-CM

## 2018-01-02 DIAGNOSIS — G89.3 CANCER ASSOCIATED PAIN: ICD-10-CM

## 2018-01-02 PROBLEM — R52 INTRACTABLE PAIN: Status: ACTIVE | Noted: 2018-01-02

## 2018-01-02 LAB
ALBUMIN SERPL-MCNC: 3 G/DL (ref 3.5–5)
ALBUMIN/GLOB SERPL: 0.8 {RATIO} (ref 1.1–2.2)
ALP SERPL-CCNC: 163 U/L (ref 45–117)
ALT SERPL-CCNC: 46 U/L (ref 12–78)
ANION GAP SERPL CALC-SCNC: 4 MMOL/L (ref 5–15)
AST SERPL-CCNC: 37 U/L (ref 15–37)
ATRIAL RATE: 100 BPM
BASOPHILS # BLD: 0 K/UL
BASOPHILS NFR BLD: 0 %
BILIRUB SERPL-MCNC: 0.6 MG/DL (ref 0.2–1)
BNP SERPL-MCNC: 320 PG/ML (ref 0–125)
BUN SERPL-MCNC: 9 MG/DL (ref 6–20)
BUN/CREAT SERPL: 20 (ref 12–20)
CALCIUM SERPL-MCNC: 8.2 MG/DL (ref 8.5–10.1)
CALCULATED P AXIS, ECG09: 71 DEGREES
CALCULATED R AXIS, ECG10: 41 DEGREES
CALCULATED T AXIS, ECG11: 73 DEGREES
CHLORIDE SERPL-SCNC: 101 MMOL/L (ref 97–108)
CK SERPL-CCNC: 33 U/L (ref 26–192)
CO2 SERPL-SCNC: 33 MMOL/L (ref 21–32)
CREAT SERPL-MCNC: 0.44 MG/DL (ref 0.55–1.02)
DIAGNOSIS, 93000: NORMAL
DIFFERENTIAL METHOD BLD: ABNORMAL
EOSINOPHIL # BLD: 0 K/UL
EOSINOPHIL NFR BLD: 0 %
ERYTHROCYTE [DISTWIDTH] IN BLOOD BY AUTOMATED COUNT: 14 % (ref 11.5–14.5)
GLOBULIN SER CALC-MCNC: 4 G/DL (ref 2–4)
GLUCOSE SERPL-MCNC: 105 MG/DL (ref 65–100)
HCT VFR BLD AUTO: 38 % (ref 35–47)
HGB BLD-MCNC: 12.5 G/DL (ref 11.5–16)
INR PPP: 1.1 (ref 0.9–1.1)
LYMPHOCYTES # BLD: 2.9 K/UL
LYMPHOCYTES NFR BLD: 17 %
MAGNESIUM SERPL-MCNC: 2.3 MG/DL (ref 1.6–2.4)
MCH RBC QN AUTO: 28.7 PG (ref 26–34)
MCHC RBC AUTO-ENTMCNC: 32.9 G/DL (ref 30–36.5)
MCV RBC AUTO: 87.2 FL (ref 80–99)
MONOCYTES # BLD: 0.9 K/UL
MONOCYTES NFR BLD: 5 %
NEUTS BAND NFR BLD MANUAL: 1 %
NEUTS SEG # BLD: 13.4 K/UL
NEUTS SEG NFR BLD: 77 %
P-R INTERVAL, ECG05: 134 MS
PLATELET # BLD AUTO: 243 K/UL (ref 150–400)
POTASSIUM SERPL-SCNC: 3.8 MMOL/L (ref 3.5–5.1)
PROT SERPL-MCNC: 7 G/DL (ref 6.4–8.2)
PROTHROMBIN TIME: 10.8 SEC (ref 9–11.1)
Q-T INTERVAL, ECG07: 370 MS
QRS DURATION, ECG06: 100 MS
QTC CALCULATION (BEZET), ECG08: 477 MS
RBC # BLD AUTO: 4.36 M/UL (ref 3.8–5.2)
RBC MORPH BLD: ABNORMAL
SODIUM SERPL-SCNC: 138 MMOL/L (ref 136–145)
TROPONIN I SERPL-MCNC: <0.04 NG/ML
VENTRICULAR RATE, ECG03: 100 BPM
WBC # BLD AUTO: 17.2 K/UL (ref 3.6–11)

## 2018-01-02 PROCEDURE — 96374 THER/PROPH/DIAG INJ IV PUSH: CPT

## 2018-01-02 PROCEDURE — 93005 ELECTROCARDIOGRAM TRACING: CPT

## 2018-01-02 PROCEDURE — 85610 PROTHROMBIN TIME: CPT | Performed by: EMERGENCY MEDICINE

## 2018-01-02 PROCEDURE — 77030029684 HC NEB SM VOL KT MONA -A

## 2018-01-02 PROCEDURE — 74011250636 HC RX REV CODE- 250/636: Performed by: INTERNAL MEDICINE

## 2018-01-02 PROCEDURE — 65270000015 HC RM PRIVATE ONCOLOGY

## 2018-01-02 PROCEDURE — 94761 N-INVAS EAR/PLS OXIMETRY MLT: CPT

## 2018-01-02 PROCEDURE — 77030003560 HC NDL HUBR BARD -A

## 2018-01-02 PROCEDURE — 71275 CT ANGIOGRAPHY CHEST: CPT

## 2018-01-02 PROCEDURE — 94640 AIRWAY INHALATION TREATMENT: CPT

## 2018-01-02 PROCEDURE — 82550 ASSAY OF CK (CPK): CPT | Performed by: EMERGENCY MEDICINE

## 2018-01-02 PROCEDURE — 74011636320 HC RX REV CODE- 636/320: Performed by: EMERGENCY MEDICINE

## 2018-01-02 PROCEDURE — 99285 EMERGENCY DEPT VISIT HI MDM: CPT

## 2018-01-02 PROCEDURE — 83880 ASSAY OF NATRIURETIC PEPTIDE: CPT | Performed by: EMERGENCY MEDICINE

## 2018-01-02 PROCEDURE — 74011250637 HC RX REV CODE- 250/637: Performed by: INTERNAL MEDICINE

## 2018-01-02 PROCEDURE — 36415 COLL VENOUS BLD VENIPUNCTURE: CPT | Performed by: EMERGENCY MEDICINE

## 2018-01-02 PROCEDURE — 80053 COMPREHEN METABOLIC PANEL: CPT | Performed by: EMERGENCY MEDICINE

## 2018-01-02 PROCEDURE — 74011000250 HC RX REV CODE- 250: Performed by: INTERNAL MEDICINE

## 2018-01-02 PROCEDURE — 74011250637 HC RX REV CODE- 250/637: Performed by: EMERGENCY MEDICINE

## 2018-01-02 PROCEDURE — 84484 ASSAY OF TROPONIN QUANT: CPT | Performed by: EMERGENCY MEDICINE

## 2018-01-02 PROCEDURE — 85025 COMPLETE CBC W/AUTO DIFF WBC: CPT | Performed by: EMERGENCY MEDICINE

## 2018-01-02 PROCEDURE — 83735 ASSAY OF MAGNESIUM: CPT | Performed by: EMERGENCY MEDICINE

## 2018-01-02 PROCEDURE — 71046 X-RAY EXAM CHEST 2 VIEWS: CPT

## 2018-01-02 PROCEDURE — 74011250636 HC RX REV CODE- 250/636: Performed by: EMERGENCY MEDICINE

## 2018-01-02 RX ORDER — DIPHENHYDRAMINE HYDROCHLORIDE 50 MG/ML
25 INJECTION, SOLUTION INTRAMUSCULAR; INTRAVENOUS
Status: DISCONTINUED | OUTPATIENT
Start: 2018-01-02 | End: 2018-01-04 | Stop reason: HOSPADM

## 2018-01-02 RX ORDER — IPRATROPIUM BROMIDE AND ALBUTEROL SULFATE 2.5; .5 MG/3ML; MG/3ML
3 SOLUTION RESPIRATORY (INHALATION)
Status: DISCONTINUED | OUTPATIENT
Start: 2018-01-02 | End: 2018-01-04 | Stop reason: HOSPADM

## 2018-01-02 RX ORDER — ALBUTEROL SULFATE 90 UG/1
2 AEROSOL, METERED RESPIRATORY (INHALATION)
COMMUNITY

## 2018-01-02 RX ORDER — SODIUM CHLORIDE 0.9 % (FLUSH) 0.9 %
5-10 SYRINGE (ML) INJECTION AS NEEDED
Status: DISCONTINUED | OUTPATIENT
Start: 2018-01-02 | End: 2018-01-04 | Stop reason: HOSPADM

## 2018-01-02 RX ORDER — FLUTICASONE FUROATE AND VILANTEROL 100; 25 UG/1; UG/1
1 POWDER RESPIRATORY (INHALATION) DAILY
Status: DISCONTINUED | OUTPATIENT
Start: 2018-01-03 | End: 2018-01-04 | Stop reason: HOSPADM

## 2018-01-02 RX ORDER — ONDANSETRON HYDROCHLORIDE 8 MG/1
8 TABLET, FILM COATED ORAL
COMMUNITY

## 2018-01-02 RX ORDER — PSYLLIUM HUSK 0.4 G
1 CAPSULE ORAL DAILY
COMMUNITY
End: 2018-03-06

## 2018-01-02 RX ORDER — SODIUM CHLORIDE 9 MG/ML
50 INJECTION, SOLUTION INTRAVENOUS
Status: COMPLETED | OUTPATIENT
Start: 2018-01-02 | End: 2018-01-02

## 2018-01-02 RX ORDER — HYDROMORPHONE HYDROCHLORIDE 2 MG/ML
1 INJECTION, SOLUTION INTRAMUSCULAR; INTRAVENOUS; SUBCUTANEOUS
Status: DISCONTINUED | OUTPATIENT
Start: 2018-01-02 | End: 2018-01-02

## 2018-01-02 RX ORDER — BENZONATATE 100 MG/1
200 CAPSULE ORAL 3 TIMES DAILY
Status: DISCONTINUED | OUTPATIENT
Start: 2018-01-02 | End: 2018-01-04 | Stop reason: HOSPADM

## 2018-01-02 RX ORDER — OXYCODONE AND ACETAMINOPHEN 5; 325 MG/1; MG/1
1 TABLET ORAL
Status: DISCONTINUED | OUTPATIENT
Start: 2018-01-02 | End: 2018-01-04 | Stop reason: HOSPADM

## 2018-01-02 RX ORDER — ACETAMINOPHEN 325 MG/1
650 TABLET ORAL
Status: DISCONTINUED | OUTPATIENT
Start: 2018-01-02 | End: 2018-01-04 | Stop reason: HOSPADM

## 2018-01-02 RX ORDER — ACETAMINOPHEN 500 MG
1000 TABLET ORAL
COMMUNITY

## 2018-01-02 RX ORDER — NALOXONE HYDROCHLORIDE 0.4 MG/ML
0.4 INJECTION, SOLUTION INTRAMUSCULAR; INTRAVENOUS; SUBCUTANEOUS AS NEEDED
Status: DISCONTINUED | OUTPATIENT
Start: 2018-01-02 | End: 2018-01-04 | Stop reason: HOSPADM

## 2018-01-02 RX ORDER — ENOXAPARIN SODIUM 100 MG/ML
40 INJECTION SUBCUTANEOUS EVERY 24 HOURS
Status: DISCONTINUED | OUTPATIENT
Start: 2018-01-02 | End: 2018-01-04 | Stop reason: HOSPADM

## 2018-01-02 RX ORDER — SODIUM CHLORIDE 0.9 % (FLUSH) 0.9 %
10 SYRINGE (ML) INJECTION
Status: COMPLETED | OUTPATIENT
Start: 2018-01-02 | End: 2018-01-02

## 2018-01-02 RX ORDER — HYDROMORPHONE HYDROCHLORIDE 2 MG/ML
1 INJECTION, SOLUTION INTRAMUSCULAR; INTRAVENOUS; SUBCUTANEOUS
Status: DISCONTINUED | OUTPATIENT
Start: 2018-01-02 | End: 2018-01-04 | Stop reason: HOSPADM

## 2018-01-02 RX ORDER — SODIUM CHLORIDE 0.9 % (FLUSH) 0.9 %
5-10 SYRINGE (ML) INJECTION EVERY 8 HOURS
Status: DISCONTINUED | OUTPATIENT
Start: 2018-01-02 | End: 2018-01-04 | Stop reason: HOSPADM

## 2018-01-02 RX ORDER — GUAIFENESIN 100 MG/5ML
200 SOLUTION ORAL
Status: DISCONTINUED | OUTPATIENT
Start: 2018-01-02 | End: 2018-01-04 | Stop reason: HOSPADM

## 2018-01-02 RX ORDER — MORPHINE SULFATE 2 MG/ML
4 INJECTION, SOLUTION INTRAMUSCULAR; INTRAVENOUS
Status: COMPLETED | OUTPATIENT
Start: 2018-01-02 | End: 2018-01-02

## 2018-01-02 RX ORDER — ONDANSETRON 2 MG/ML
4 INJECTION INTRAMUSCULAR; INTRAVENOUS
Status: DISCONTINUED | OUTPATIENT
Start: 2018-01-02 | End: 2018-01-04 | Stop reason: HOSPADM

## 2018-01-02 RX ADMIN — IPRATROPIUM BROMIDE AND ALBUTEROL SULFATE 3 ML: .5; 3 SOLUTION RESPIRATORY (INHALATION) at 17:16

## 2018-01-02 RX ADMIN — BENZONATATE 200 MG: 100 CAPSULE ORAL at 19:58

## 2018-01-02 RX ADMIN — Medication 10 ML: at 11:55

## 2018-01-02 RX ADMIN — ENOXAPARIN SODIUM 40 MG: 40 INJECTION SUBCUTANEOUS at 19:59

## 2018-01-02 RX ADMIN — Medication 5 ML: at 19:59

## 2018-01-02 RX ADMIN — GUAIFENESIN 200 MG: 200 SOLUTION ORAL at 23:24

## 2018-01-02 RX ADMIN — OXYCODONE HYDROCHLORIDE AND ACETAMINOPHEN 1 TABLET: 5; 325 TABLET ORAL at 19:58

## 2018-01-02 RX ADMIN — IPRATROPIUM BROMIDE AND ALBUTEROL SULFATE 3 ML: .5; 3 SOLUTION RESPIRATORY (INHALATION) at 20:11

## 2018-01-02 RX ADMIN — SODIUM CHLORIDE 50 ML/HR: 900 INJECTION, SOLUTION INTRAVENOUS at 11:55

## 2018-01-02 RX ADMIN — MORPHINE SULFATE 4 MG: 2 INJECTION, SOLUTION INTRAMUSCULAR; INTRAVENOUS at 12:55

## 2018-01-02 RX ADMIN — IOPAMIDOL 100 ML: 755 INJECTION, SOLUTION INTRAVENOUS at 11:55

## 2018-01-02 NOTE — OP NOTES
DATE OF PROCEDURE:  12/22/2017     SURGEON: No Lopez MD     PREOPERATIVE DIAGNOSIS: lung cancer. POSTOPERATIVE DIAGNOSIS: same. PROCEDURE:  Placement of right chest PowerPort via cephalic cutdown (CPT 07296)    ANESTHESIA:  LOMAC. ESTIMATED BLOOD LOSS:  Minimal.    DESCRIPTION OF THE PROCEDURE:  After obtaining informed consent, the patient was taken to the operating room and placed supine on the operating table.  An operative time-out was performed, and conscious sedation was induced.  The chest and shoulder were prepped and draped in the usual sterile fashion, and an [place] was employed.  Preoperative antibiotics were given. The deltopectoral groove was palpated.   Approximately 20mL equal parts 0.5% Marcaine with epinephrine and 1% Lidocaine were infiltrated along the planned incision and pocket.  An incision approximately 4 cm in length was made approximately 3 cm inferior to this.  Inferior and superior flaps were raised using electrocautery.  The deltopectoral groove was identified and dissected.  Within this the cephalic vein was found.  The cephalic vein was surrounded with a vessel loop and attention was then directed towards making adequate pocket for the port on the pectoralis muscle.      The adipose tissue was cleared from the pectoralis fascia in an inferior direction.  We then directed our attention towards insertion of the catheter.  Using an 60-WCYLK, the cephalic vein was incised and using the shoehorn provided in the kit, the venotomy was held open and the catheter, which had been flushed with heparinized saline was inserted into the cephalic vein and introduced to approximately 20 cm at the venotomy.  Fluoroscopy was then used to position the tip of the catheter at the junction of the SVC and right atrium.  The cephalic vein was divided just distal to the venotomy and the distal end was ligated with a 3-0 silk tie.  The vein was secured around the catheter with 3-0 silk tie.  The catheter was then trimmed and the port was attached.  This has been flushed with heparinized saline previously.  This was then implanted on the pectoralis major fascia using 3 separate 2-0 PDS sutures in the ports needle holes.  The catheter lay flat and the cavity was then irrigated with bacitracin and saline and checked for hemostasis.  Excellent hemostasis was noted with minimal electrocautery.      The superficial fascia was then closed with a running 3-0 Vicryl suture.  The deep dermal tissues were irrigated with bacitracin and saline, and closed with a running 4-0 Monocryl suture.  The port was accesses and was check for good flushing and withdrawing. 5-mL heparinized saline were then instilled in the port. The incision was dressed with Dermabond and the patient was recovered from general anesthesia and taken to the recovery area in satisfactory condition.  All instrument, sponge, and needle counts were reported as correct.  A final chest x-ray will be checked in the PACU.

## 2018-01-02 NOTE — PROGRESS NOTES
Pharmacy Clarification of Prior to Admission Medication Regimen     The patient was interviewed regarding clarification of the prior to admission medication regimen.  was present in room and obtained permission from patient to discuss drug regimen with visitor(s) present. Patient was questioned regarding use of any other inhalers, topical products, over the counter medications, herbal medications, vitamin products or ophthalmic/nasal/otic medication use. Information Obtained From: Patient, RX Query    Pertinent Pharmacy Findings:   Identified High Alert Medication Information  o Current IV Chemotherapy Regimen:  - Oncologist: Dr. Sue Leos  - Location receiving chemotherapy: [facility] [address] treatment date: 17   filgrastim-sndz [facility]) 300 mcg/0.5 mL syrg injection: Patient started a 5 day regimen on 17. Patient has completed 4 days of therapy as of 18.  docusate sodium (COLACE) 100 mg capsule: Patient stated she has this agent has not taken it. PTA medication list was corrected to the following:     Prior to Admission Medications   Prescriptions Last Dose Informant Patient Reported? Taking? MULTIVITAMIN PO 2018 at Unknown time Self Yes Yes   Sig: Take 1 Tab by mouth daily. acetaminophen (TYLENOL) 500 mg tablet 2018 at Unknown time Self Yes Yes   Sig: Take 1,000 mg by mouth every six (6) hours as needed for Pain. albuterol (PROVENTIL HFA, VENTOLIN HFA, PROAIR HFA) 90 mcg/actuation inhaler 2018 at Unknown time Self Yes Yes   Sig: Take 2 Puffs by inhalation every four (4) hours as needed for Wheezing or Shortness of Breath. albuterol (PROVENTIL VENTOLIN) 2.5 mg /3 mL (0.083 %) nebulizer solution 2018 at Unknown time Self Yes Yes   Si.5 mg by Nebulization route three (3) times daily as needed for Wheezing or Shortness of Breath.  Only for emergencies or if an infection sets in    benzonatate (TESSALON) 100 mg capsule 2018 at Unknown time Self Yes Yes   Sig: Take 100 mg by mouth three (3) times daily as needed for Cough. calcium carbonate-vitamin D3 1,000 mg(2,500 mg)-800 unit tab 2018 at Unknown time Self Yes Yes   Sig: Take 1 Tab by mouth daily. docusate sodium (COLACE) 100 mg capsule Not Taking at Unknown time Self No No   Sig: Take 1 Cap by mouth two (2) times a day. May use OTC instead. Prevents constipation from narcotics. ezetimibe (ZETIA) 10 mg tablet 2017 at Unknown time Self Yes Yes   Sig: Take 10 mg by mouth daily. fenofibrate (TRICOR) 160 mg tablet 2017 at Unknown time Self Yes Yes   Sig: Take 160 mg by mouth daily. filgrastim-sndz (ZARXIO) 300 mcg/0.5 mL syrg injection 2018 at Unknown time Self Yes Yes   Si mcg by SubCUTAneous route five (5) days a week. Start the day after chemo, take for 5 days   fluticasone-salmeterol (ADVAIR DISKUS) 250-50 mcg/dose diskus inhaler 2018 at Unknown time Self Yes Yes   Sig: Take 1 Puff by inhalation two (2) times a day. folic acid (FOLVITE) 1 mg tablet 2018 at Unknown time Self Yes Yes   Sig: Take 1 mg by mouth daily. ondansetron hcl (ZOFRAN) 8 mg tablet 2018 at Unknown time Self Yes Yes   Sig: Take 8 mg by mouth every eight (8) hours as needed for Nausea. traMADol (ULTRAM) 50 mg tablet 2018 at Unknown time Self Yes Yes   Sig: Take 50 mg by mouth every eight (8) hours as needed for Pain (pain/cough).       Facility-Administered Medications: None          Thank you,  Zak Dickey CPhT  Medication History Pharmacy Technician

## 2018-01-02 NOTE — ED NOTES
Pt ambulated to restroom, pt became slightly unsteady. Urine specimen obtained. Pt is shortwinded  Pt has a pillow under bottom.    Pain 6/10 between shoulder blades

## 2018-01-02 NOTE — ED PROVIDER NOTES
EMERGENCY DEPARTMENT HISTORY AND PHYSICAL EXAM      Date: 1/2/2018  Patient Name: Yun Parikh    History of Presenting Illness     Chief Complaint   Patient presents with    Chest Pain     Ambulatory w/  c/o chest pain, SOB, back & shoulder pain for several days, recent stage 4 CA diagnosis, first chemo thursday    Shortness of Breath     x 2 days    Back Pain    Shoulder Pain     History Provided By: Patient    HPI: Yun Parikh, 79 y.o. female with PMHx significant for HTN ,COPD, and arthritis presents ambulatory to the ED with cc of gradually worsening SOB for the past several days. Per pt, her SOB has been persistent without relief for the past several days. Pt reports her SOB is exacerbated with laying flat and her SOB has been waking her up at night time. Pt states she has been sleeping sitting up for alleviation of the SOB. Pt reports taking nebulizer treatments with little relief to her SOB. Pt reports she has Stage IV lung cancer (recent L subclavicular mass biopsy 12/04/3017) and recently underwent her first chemotherapy session on 12/28/2017 after which she noted the SOB. Pt also informs of mild, intermittent chest pain with an associated tightening, burning, and heavy sensation to the region. Pt informs her chest pain is increased with bouts of increased SOB. Pt lastly informs of recent exacerbation of her chronic bilateral shoulders and upper back pain with a moderate-high intensity and an associated sore, aching sensation to the aforementioned regions. Pt reports her pain is increased with movements. Pt expresses concern for symptomatic alleviation. Pt specifically denies any nausea, vomiting, fevers, chills, abdominal pain, urinary complications, or headache.     PMHx: hyperlipidemia, IBS  PSHx: tonsillectomy, cholecystectomy, appendectomy   Social Hx: - EtOH; + Smoker; - Illicit Drugs    PCP: Juanito Guzman MD    There are no other complaints, changes, or physical findings at this time. Current Outpatient Prescriptions   Medication Sig Dispense Refill    albuterol (PROVENTIL HFA, VENTOLIN HFA, PROAIR HFA) 90 mcg/actuation inhaler Take 2 Puffs by inhalation every four (4) hours as needed for Wheezing or Shortness of Breath.  acetaminophen (TYLENOL) 500 mg tablet Take 1,000 mg by mouth every six (6) hours as needed for Pain.  filgrastim-sndz (ZARXIO) 300 mcg/0.5 mL syrg injection 300 mcg by SubCUTAneous route five (5) days a week. Start the day after chemo, take for 5 days      ondansetron hcl (ZOFRAN) 8 mg tablet Take 8 mg by mouth every eight (8) hours as needed for Nausea.  calcium carbonate-vitamin D3 1,000 mg(2,500 mg)-800 unit tab Take 1 Tab by mouth daily.  traMADol (ULTRAM) 50 mg tablet Take 50 mg by mouth every eight (8) hours as needed for Pain (pain/cough).  benzonatate (TESSALON) 100 mg capsule Take 100 mg by mouth three (3) times daily as needed for Cough.  folic acid (FOLVITE) 1 mg tablet Take 1 mg by mouth daily.  fluticasone-salmeterol (ADVAIR DISKUS) 250-50 mcg/dose diskus inhaler Take 1 Puff by inhalation two (2) times a day.  ezetimibe (ZETIA) 10 mg tablet Take 10 mg by mouth daily.  fenofibrate (TRICOR) 160 mg tablet Take 160 mg by mouth daily.  MULTIVITAMIN PO Take 1 Tab by mouth daily.  albuterol (PROVENTIL VENTOLIN) 2.5 mg /3 mL (0.083 %) nebulizer solution 2.5 mg by Nebulization route three (3) times daily as needed for Wheezing or Shortness of Breath. Only for emergencies or if an infection sets in       docusate sodium (COLACE) 100 mg capsule Take 1 Cap by mouth two (2) times a day. May use OTC instead. Prevents constipation from narcotics.  60 Cap 0     Past History     Past Medical History:  Past Medical History:   Diagnosis Date    Arthritis     hands    Back pain     Cancer ([facility] [address] 11/06/2017    metastatic lung cancer    Chronic obstructive pulmonary disease (HCC)     Chronic pain     shoulder/back  Diverticulitis     Hypercholesterolemia     Hypertension     IBS (irritable bowel syndrome)     Menopause 1982    Muscle ache      Past Surgical History:  Past Surgical History:   Procedure Laterality Date    CHEST SURGERY PROCEDURE UNLISTED      bronchoscopy    HX APPENDECTOMY      [id number]    umbilical    HX HYSTERECTOMY  1982    HX OOPHORECTOMY Left 1982    HX OOPHORECTOMY Right 1983    HX OTHER [address], 1981    D&C    HX OTHER [address]    partial hysterectomy    HX OTHER SURGICAL  1982    hysterectomy    HX OTHER SURGICAL  1982    breast implants, [facility]    HX OTHER SURGICAL  2011    implant removal, [facility]    HX OTHER SURGICAL      colonoscopy    HX TONSILLECTOMY  1971    IMPLANT BREAST SILICONE/EQ Bilateral 5041    REMOVAL OF BREAST IMPLANT Bilateral 1989     Family History:  Family History   Problem Relation Age of Onset    Family history unknown: Yes     Social History:  Social History   Substance Use Topics    Smoking status: Former Smoker     Packs/day: 1.00     Years: 17.00     Quit date: 4/2/1986    Smokeless tobacco: Never Used    Alcohol use No     Allergies: Allergies   Allergen Reactions    Demerol [Meperidine] Nausea and Vomiting    Doxycycline Other (comments)     Female infection    Erythromycin Other (comments)     Female infection    Hydrocodone Itching     Dizzy, Headache    Penicillins Other (comments)     From childhood    Percodan [Oxycodone Hcl-Oxycodone-Asa] Nausea and Vomiting     headache    Sulfa (Sulfonamide Antibiotics) Swelling    Tetracycline Other (comments)     Female infection    Xylocaine [Lidocaine Hcl] Nausea Only     headache     Review of Systems   Review of Systems   Constitutional: Negative for chills, fatigue and fever. HENT: Negative for congestion, rhinorrhea and sore throat.     Eyes: Negative for pain, discharge and visual disturbance. Respiratory: Positive for shortness of breath. Negative for cough, chest tightness and wheezing. Cardiovascular: Positive for chest pain. Negative for palpitations and leg swelling. Gastrointestinal: Negative for abdominal pain, constipation, diarrhea, nausea and vomiting. Genitourinary: Negative for dysuria, frequency, hematuria and urgency. Musculoskeletal: Positive for arthralgias (BL shoulders), back pain (upper) and myalgias (BL shoulders). Skin: Negative for rash. Neurological: Negative for dizziness, weakness, light-headedness and headaches. Psychiatric/Behavioral: Negative. Physical Exam   Physical Exam   Constitutional: She is oriented to person, place, and time. She appears well-developed and well-nourished. No distress. HENT:   Head: Normocephalic and atraumatic. Eyes: EOM are normal. Right eye exhibits no discharge. Left eye exhibits no discharge. No scleral icterus. Neck: Normal range of motion. Neck supple. No tracheal deviation present. Cardiovascular: Regular rhythm, normal heart sounds and intact distal pulses. Tachycardia present. Exam reveals no gallop and no friction rub. No murmur heard. Low 100's    Pulmonary/Chest: Breath sounds normal. Tachypnea noted. No respiratory distress. She has no wheezes. She has no rales. [address]; well healing incision R CW   Abdominal: Soft. She exhibits no distension. There is no tenderness. Musculoskeletal: Normal range of motion. She exhibits no edema. Lymphadenopathy:     She has no cervical adenopathy. Neurological: She is alert and oriented to person, place, and time. No focal neuro deficits   Skin: Skin is warm and dry. No rash noted. Psychiatric: She has a normal mood and affect. Nursing note and vitals reviewed.     Diagnostic Study Results     Labs -     Recent Results (from the past 12 hour(s))   EKG, 12 LEAD, INITIAL    Collection Time: 01/02/18  9:34 AM   Result Value Ref Range Ventricular Rate 100 BPM    Atrial Rate 100 BPM    P-R Interval 134 ms    QRS Duration 100 ms    Q-T Interval 370 ms    QTC Calculation (Bezet) 477 ms    Calculated P Axis 71 degrees    Calculated R Axis 41 degrees    Calculated T Axis 73 degrees    Diagnosis       Normal sinus rhythm  Possible Inferior infarct , age undetermined  Abnormal ECG  When compared with ECG of 20-DEC-2017 11:33,  Minimal criteria for Anterior infarct are no longer present  Borderline criteria for Inferior infarct are now present  Nonspecific T wave abnormality now evident in Lateral leads     CBC WITH AUTOMATED DIFF    Collection Time: 01/02/18 10:17 AM   Result Value Ref Range    WBC 17.2 (H) 3.6 - 11.0 K/uL    RBC 4.36 3.80 - 5.20 M/uL    HGB 12.5 11.5 - 16.0 g/dL    HCT 38.0 35.0 - 47.0 %    MCV 87.2 80.0 - 99.0 FL    MCH 28.7 26.0 - 34.0 PG    MCHC 32.9 30.0 - 36.5 g/dL    RDW 14.0 11.5 - 14.5 %    PLATELET 235 805 - 392 K/uL    NEUTROPHILS 77 %    BAND NEUTROPHILS 1 %    LYMPHOCYTES 17 %    MONOCYTES 5 %    EOSINOPHILS 0 %    BASOPHILS 0 %    ABS. NEUTROPHILS 13.4 K/UL    ABS. LYMPHOCYTES 2.9 K/UL    ABS. MONOCYTES 0.9 K/UL    ABS. EOSINOPHILS 0.0 K/UL    ABS. BASOPHILS 0.0 K/UL    RBC COMMENTS NORMOCYTIC, NORMOCHROMIC      DF MANUAL     METABOLIC PANEL, COMPREHENSIVE    Collection Time: 01/02/18 10:17 AM   Result Value Ref Range    Sodium 138 136 - 145 mmol/L    Potassium 3.8 3.5 - 5.1 mmol/L    Chloride 101 97 - 108 mmol/L    CO2 33 (H) 21 - 32 mmol/L    Anion gap 4 (L) 5 - 15 mmol/L    Glucose 105 (H) 65 - 100 mg/dL    BUN 9 6 - 20 MG/DL    Creatinine 0.44 (L) 0.55 - 1.02 MG/DL    BUN/Creatinine ratio 20 12 - 20      GFR est AA >60 >60 ml/min/1.73m2    GFR est non-AA >60 >60 ml/min/1.73m2    Calcium 8.2 (L) 8.5 - 10.1 MG/DL    Bilirubin, total 0.6 0.2 - 1.0 MG/DL    ALT (SGPT) 46 12 - 78 U/L    AST (SGOT) 37 15 - 37 U/L    Alk.  phosphatase 163 (H) 45 - 117 U/L    Protein, total 7.0 6.4 - 8.2 g/dL    Albumin 3.0 (L) 3.5 - 5.0 g/dL Globulin 4.0 2.0 - 4.0 g/dL    A-G Ratio 0.8 (L) 1.1 - 2.2     CK W/ REFLX CKMB    Collection Time: 01/02/18 10:17 AM   Result Value Ref Range    CK 33 26 - 192 U/L   PROTHROMBIN TIME + INR    Collection Time: 01/02/18 10:17 AM   Result Value Ref Range    INR 1.1 0.9 - 1.1      Prothrombin time 10.8 9.0 - 11.1 sec   MAGNESIUM    Collection Time: 01/02/18 10:17 AM   Result Value Ref Range    Magnesium 2.3 1.6 - 2.4 mg/dL   NT-PRO BNP    Collection Time: 01/02/18 10:17 AM   Result Value Ref Range    NT pro- (H) 0 - 125 PG/ML   TROPONIN I    Collection Time: 01/02/18 10:17 AM   Result Value Ref Range    Troponin-I, Qt. <0.04 <0.05 ng/mL       Radiologic Studies -   CTA CHEST W OR W WO CONT   Final Result      XR CHEST PA LAT   Final Result        CT Results  (Last 48 hours)               01/02/18 1151  CTA CHEST W OR W WO CONT Final result    Impression:  IMPRESSION:    1. No evidence for pulmonary embolism. 2. Extensive worsening of metastatic disease. Significant worsening of   mediastinal lymph nodes, mass adjacent to the aortic arch which is continuous   with the known left upper lobe neoplasm, multiple bilateral pulmonary nodules,   probable lymphangitic spread of tumor, and metastatic lumbar, right rib and   right clavicular lesions. Associated narrowing of the left upper lobe pulmonary   artery. 3. Bilateral pleural effusions. 4. Possible pathologic fracture of T10. Narrative:  History: Chest pain and dyspnea. History of lung cancer. CTA of the chest was performed. 80 mL Isovue-370 were injected and scanning was   performed during the arterial phase of contrast administration. Post processing   was performed. 3D reconstructions were performed. CT dose reduction was   achieved through use of a standardized protocol tailored for this examination   and automatic exposure control for dose modulation.          There are no intraluminal filling defects to suggest pulmonary embolism. The   heart, pericardium, and great vessels appear unremarkable. The chest wall and   axilla appear unremarkable. There are small bilateral pleural effusions, right   greater than left there are enlarged mediastinal lymph nodes. These are new   compared to the exam from November 6, 2017, and are seen in the pretracheal,   precarinal, and subcarinal areas. There is also a descending soft tissue   adjacent to the aorta which measures 8.1 x 2.3 x 5.8 cm and previously measured   3.3 x 1.4 x 5.3 cm. This is continuous with the previously seen nodule in the   left upper lobe. There is a destructive right-sided rib lesion. There is a lytic   lesion in the right clavicular head. There are lytic lumbar lesions with   possible pathologic fracture of T10. There is associated narrowing of the left   upper lobe pulmonary artery. There are multiple new bilateral pulmonary nodules. There is septal thickening in the left upper lobe. There is a small pericardial   effusion. CXR Results  (Last 48 hours)               01/02/18 1039  XR CHEST PA LAT Final result    Impression:  IMPRESSION:        Nonspecific interstitial process could represent interstitial edema, development   of fibrosis, or an atypical or viral process. No focal infiltrate. Persistent left upper lobe mass and AP window adenopathy would be better   evaluated by CT. Narrative:  EXAM:  XR CHEST PA LAT       INDICATION:   cp/sob/hx lung ca       COMPARISON: 12/22/2017. FINDINGS: PA and lateral radiographs of the chest demonstrate a increased   coarsening of interstitial markings. . The Port-A-Cath tip terminates at the   caval atrial junction. The cardiac and mediastinal contours are stable including   the left upper lobe lesion and AP window adenopathy. The bones and soft tissues   are within normal limits. Medical Decision Making   I am the first provider for this patient.     I reviewed the vital signs, available nursing notes, past medical history, past surgical history, family history and social history. Vital Signs-Reviewed the patient's vital signs. Patient Vitals for the past 12 hrs:   Temp Pulse Resp BP SpO2   01/02/18 1455 - (!) 106 (!) 33 - 94 %   01/02/18 1451 - (!) 108 21 - 96 %   01/02/18 1445 - (!) 104 (!) 31 (!) 132/32 94 %   01/02/18 1441 - (!) 110 (!) 33 - 93 %   01/02/18 1422 - (!) 104 26 - 93 %   01/02/18 1415 - (!) 106 29 144/57 93 %   01/02/18 1400 - (!) 102 25 141/51 93 %   01/02/18 1345 - 99 26 138/52 95 %   01/02/18 1330 - 100 28 135/51 95 %   01/02/18 1315 - (!) 102 28 140/47 95 %   01/02/18 1303 - (!) 103 30 - 95 %   01/02/18 1300 - (!) 104 14 140/50 96 %   01/02/18 1259 - (!) 103 (!) 36 - 96 %   01/02/18 1254 - - - 164/56 -   01/02/18 1244 - (!) 103 25 - 96 %   01/02/18 1239 - 100 29 - 96 %   01/02/18 1230 - (!) 101 24 136/57 96 %   01/02/18 1215 - (!) 101 (!) 32 113/82 96 %   01/02/18 1202 - - - 150/50 -   01/02/18 1130 - 97 21 (!) 130/101 96 %   01/02/18 1124 - 98 27 133/54 95 %   01/02/18 1115 - 97 26 139/49 96 %   01/02/18 1100 - 99 30 157/57 95 %   01/02/18 1045 - (!) 107 26 149/55 96 %   01/02/18 1042 - - - 159/54 -   01/02/18 1030 98.1 °F (36.7 °C) 100 30 132/59 95 %   01/02/18 1028 - 99 25 - 96 %   01/02/18 1015 - (!) 101 25 146/56 94 %   01/02/18 1000 - (!) 101 25 148/62 95 %   01/02/18 0952 - - - 149/62 97 %     Pulse Oximetry Analysis - 97% on RA    Cardiac Monitor:   Rate: 101 bpm  Rhythm: Sinus Tachycardia      EKG interpretation: (Preliminary) 0934  Rhythm: normal sinus rhythm; and regular . Rate (approx.): 100; Axis: normal; MD interval: normal; QRS interval: normal ; ST/T wave: normal; Q waves in inferior lateral leads; unchanged from prior. Written by JEANNIE Appiah, as dictated by Lorena Harden MD.    Records Reviewed:  Old Medical Records    Provider Notes (Medical Decision Making):   Differential includes PE, worsening tumor burden/lung cancer, pleural effusion, pneumonia, heart failure, ACS    ED Course:   Initial assessment performed. The patients presenting problems have been discussed, and they are in agreement with the care plan formulated and outlined with them. I have encouraged them to ask questions as they arise throughout their visit. CONSULT NOTE:   1:45 PM  Radha Wallis MD spoke with Dr. Tamir Schaefer,  Specialty: Oncology  Discussed pt's hx, disposition, and available diagnostic and imaging results. Reviewed care plans. Consultant agrees with plans as outlined. States he will evaluate the pt once admitted. Written by Nguyễn Casey ED Scribe, as dictated by Radha Wallis MD.    Progress Note:   2:45 PM  Pt ambulated with O2 saturation dropping to 86-88%. Written by JEANNIE Mantillaibe, as dictated by Radha Wlalis MD.     CONSULT NOTE:   3:05 PM  Radha Wallis MD spoke with Dr. Nathan Singh,  Specialty: Hospitalist   Discussed pt's hx, disposition, and available diagnostic and imaging results. Reviewed care plans. Consultant agrees with plans as outlined. Will evaluate for admission. Written by Nguyễn Casey ED Scribe, as dictated by Radha Wallis MD.    Disposition:  ADMIT NOTE:    3:05 PM  Patient is being admitted to the hospital by Dr. Nathan Singh. The results of their tests and reasons for their admission have been discussed with the patient and/or available family. They convey agreement and understanding for the need to be admitted and for the admission diagnosis. PLAN:  1. Current Discharge Medication List        2. Follow-up Information     None        Return to ED if worse     Diagnosis     Clinical Impression:   1. Primary malignant neoplasm of lung metastatic to other site, unspecified laterality ([address]        2. SOB (shortness of breath)                Attestations: This note is prepared by Nguyễn Casey, acting as Scribe for Radha Wallis MD.    .Art Amend:  The scribe's documentation has been prepared under my direction and personally reviewed by me in its entirety. I confirm that the note above accurately reflects all work, treatment, procedures, and medical decision making performed by me.

## 2018-01-02 NOTE — ED NOTES
Called oncology charge RN. Awaiting a call from [facility] to give report on pt.

## 2018-01-02 NOTE — IP AVS SNAPSHOT
[address] [facility] 
[phone] Patient: Felisha Carrasco MRN: [id number] [facility]:[birth year] You are allergic to the following Allergen Reactions Latex, Natural Rubber Itching Per patient she experienced itching after pulling off latex tape post surgery. Demerol (Meperidine) Nausea and Vomiting Doxycycline Other (comments) Female infection Erythromycin Other (comments) Female infection Hydrocodone Itching Dizzy, Headache Penicillins Other (comments) From childhood Percodan (Oxycodone Hcl-Oxycodone-Asa) Nausea and Vomiting  
 headache  
    
 Sulfa (Sulfonamide Antibiotics) Swelling Tetracycline Other (comments) Female infection Xylocaine (Lidocaine Hcl) Nausea Only  
 headache Recent Documentation Height Weight BMI OB Status Smoking Status 1.638 m 63.5 kg 23.66 kg/m2 Hysterectomy Former Smoker Emergency Contacts  (Rel.) Home Phone Work Phone Mobile Phone Jonh Sue (Spouse) [phone] -- [phone] About your hospitalization You were admitted on:  January 2, 2018 You last received care in the:  [address] You were discharged on:  January 4, 2018 Why you were hospitalized Your primary diagnosis was:  Not on File Your diagnoses also included:  Sob (Shortness Of Breath), Intractable Pain Providers Seen During Your Hospitalization Provider Specialty Primary office phone Aleksey Cai MD Emergency Medicine [phone] Chuck Hartman MD Internal Medicine [phone] Your Primary Care Physician (PCP) Primary Care Physician Office Phone Office Fax [place] Gloria [phone] Follow-up Information Follow up With Details Comments Contact Info MD Krish Myers [address] [facility] [address] 
[phone] Sultana Stephens MD Schedule an appointment as soon as possible for a visit  [address] [facility] [address] 
[phone] Appointments for Next 14 days 1/11/2018 10:00 AM  POST OP [facility] Medications STOP taking these medications ALBUTEROL IN  
   
  
 PERIDEX 0.12 % solution Generic drug:  chlorhexidine TAKE these medications as instructed Instructions Each Dose to Equal  
 Morning Noon Evening Bedtime  
 acetaminophen 500 mg tablet Commonly known as:  TYLENOL Your last dose was: Your next dose is: Take 1,000 mg by mouth every six (6) hours as needed for Pain. 1000 mg ADVAIR DISKUS 250-50 mcg/dose diskus inhaler Generic drug:  fluticasone-salmeterol Your last dose was: Your next dose is: Take 1 Puff by inhalation two (2) times a day. 1 Puff * albuterol 2.5 mg /3 mL (0.083 %) nebulizer solution Commonly known as:  PROVENTIL VENTOLIN Your last dose was: Your next dose is:    
   
   
 2.5 mg by Nebulization route three (3) times daily as needed for Wheezing or Shortness of Breath. Only for emergencies or if an infection sets in  
 2.5 mg  
    
   
   
   
  
 * albuterol 90 mcg/actuation inhaler Commonly known as:  PROVENTIL HFA, VENTOLIN HFA, PROAIR HFA Your last dose was: Your next dose is: Take 2 Puffs by inhalation every four (4) hours as needed for Wheezing or Shortness of Breath. 2 Puff  
    
   
   
   
  
 benzonatate 100 mg capsule Commonly known as:  TESSALON Your last dose was: Your next dose is: Take 2 Caps by mouth three (3) times daily as needed for Cough. 200 mg  
    
   
   
   
  
 calcium carbonate-vitamin D3 1,000 mg(2,500 mg)-800 unit Tab Your last dose was: Your next dose is: Take 1 Tab by mouth daily. 1 Tab  
    
   
   
   
  
 docusate sodium 100 mg capsule Commonly known as:  Prakash Anton Your last dose was: Your next dose is: Take 1 Cap by mouth two (2) times a day. May use OTC instead. Prevents constipation from narcotics. 100 mg  
    
   
   
   
  
 fenofibrate 160 mg tablet Commonly known as:  LOFIBRA Your last dose was: Your next dose is: Take 160 mg by mouth daily. 160 mg  
    
   
   
   
  
 folic acid 1 mg tablet Commonly known as:  Google Your last dose was: Your next dose is: Take 1 mg by mouth daily. 1 mg MULTIVITAMIN PO Your last dose was: Your next dose is: Take 1 Tab by mouth daily. 1 Tab  
    
   
   
   
  
 ondansetron hcl 8 mg tablet Commonly known as:  Margarette Jimenez Your last dose was: Your next dose is: Take 8 mg by mouth every eight (8) hours as needed for Nausea. 8 mg  
    
   
   
   
  
 oxyCODONE-acetaminophen 5-325 mg per tablet Commonly known as:  PERCOCET Your last dose was: Your next dose is: Take 1 Tab by mouth every four (4) hours as needed. Max Daily Amount: 6 Tabs. Indications: Pain 1 Tab  
    
   
   
   
  
 traMADol 50 mg tablet Commonly known as:  ULTRAM  
   
Your last dose was: Your next dose is: Take 50 mg by mouth every eight (8) hours as needed for Pain (pain/cough). 50 mg ZARXIO 300 mcg/0.5 mL Syrg injection Generic drug:  filgrastim-sndz Your last dose was: Your next dose is:    
   
   
 300 mcg by SubCUTAneous route five (5) days a week. Start the day after chemo, take for 5 days 300 mcg ZETIA 10 mg tablet Generic drug:  ezetimibe Your last dose was: Your next dose is: Take 10 mg by mouth daily. 10 mg  
    
   
   
   
  
 * Notice: This list has 2 medication(s) that are the same as other medications prescribed for you. Read the directions carefully, and ask your doctor or other care provider to review them with you. Where to Get Your Medications Information on where to get these meds will be given to you by the nurse or doctor. ! Ask your nurse or doctor about these medications  
  benzonatate 100 mg capsule  
 oxyCODONE-acetaminophen 5-325 mg per tablet Discharge Instructions HOSPITALIST DISCHARGE INSTRUCTIONS 
 
NAME: Miguel Sue :  1950 MRN:  [id number] Date/Time:  2018 11:09 AM 
 
ADMIT DATE: 2018 DISCHARGE DATE: 2018 DISCHARGE DIAGNOSIS: 
Progressive SOB/CABRALES and orthopnea Worsening Chest, back and shoulder pains Persistent cough Generalized weakness  
Stage 4 Lung cancer (worsening mets on CT scan) Brain metastasis, s/p recent gamma knife Bone (rib/spine) metastasis COPD MEDICATIONS: 
· It is important that you take the medication exactly as they are prescribed. · Keep your medication in the bottles provided by the pharmacist and keep a list of the medication names, dosages, and times to be taken in your wallet. · Do not take other medications without consulting your doctor. Pain Management: per above medications What to do at [facility] Recommended diet:  Resume previous diet Recommended activity: Activity as tolerated If you have questions regarding the hospital related prescriptions or hospital related issues please call Rita Rogers at .  
 
If you experience any of the following symptoms then please call your primary care physician or return to the emergency room if you cannot get hold of your doctor: 
Fever, chills, nausea, vomiting, diarrhea, change in mentation, falling, bleeding, shortness of breath Follow Up: 
Dr. Mariann Diaz MD  you are to call and set up an appointment to see them in 7-10 days. Information obtained by : 
I understand that if any problems occur once I am at home I am to contact my physician. I understand and acknowledge receipt of the instructions indicated above. Physician's or R.N.'s Signature                                                                  Date/Time Patient or Representative Signature                                                          Date/Time Discharge Orders None [employer] Announcement We are excited to announce that we are making your provider's discharge notes available to you in [employer]. You will see these notes when they are completed and signed by the physician that discharged you from your recent hospital stay. If you have any questions or concerns about any information you see in [employer], please call the Health Information Department where you were seen or reach out to your Primary Care Provider for more information about your plan of care. Introducing [facility]! Dear May Sample: Thank you for requesting a [employer] account. Our records indicate that you already have an active [employer] account. You can access your account anytime at [email or web address] Did you know that you can access your hospital and ER discharge instructions at any time in [employer]? You can also review all of your test results from your hospital stay or ER visit. Additional Information If you have questions, please visit the Frequently Asked Questions section of the [employer] website at [email or web address] Remember, MyChart is NOT to be used for urgent needs. For medical emergencies, dial 911. Now available from your iPhone and Android! General Information Please provide this summary of care documentation to your next provider. Patient Signature:  ____________________________________________________________ Date:  ____________________________________________________________  
  
Annabel Must Provider Signature:  ____________________________________________________________ Date:  ____________________________________________________________

## 2018-01-02 NOTE — ED NOTES
Pt back from imaging. Labored breathing noted. Pt reports having a hard time to catch breath, coughing spell occurred. Pt has family at the bedside.

## 2018-01-02 NOTE — ED NOTES
MD at bedside montelukast (SINGULAIR) 10 MG      Last Written Prescription Date: 4/14/16  Last Fill Quantity: 90,  # refills: 3   Last Office Visit with [facility], [facility] or [facility] prescribing provider: 3/21/17

## 2018-01-02 NOTE — ED NOTES
Pt is uncomfortable, changing positions frequently. Pt currently sitting in chair. Pt has several questions regarding insurance and admission. I advised I will let case management know and also will discuss with MD pain control.

## 2018-01-02 NOTE — ED NOTES
Repositioned pt in bed due to coccyx discomfort. Pt became short of breath with this movement. Pillow propped up under (R) buttocks.

## 2018-01-02 NOTE — ED NOTES
Assumed care of pt from triage. Pt  at bedside. Back pain has been ongoing x few weeks. Pt has stage 4 adeno lung ca, mets to bones, and lymphnodes. Received on first on chemo on Thursday. Pt called oncologist and was informed to come to ED. Chest pain started this morning, pt has difficulty laying down at night so she sleeps in a sit up chair, reports a   \"gripping\" pain with shortness of breath. Pt tried neb tx and inhaler without relief. Denies hx of heart attack, pt has HTN, denies smoking. Denies smoking over 30 years, pt has COPD.

## 2018-01-02 NOTE — ED NOTES
PCT ambulated pt, 92% on room air at beginning of ambulation, dropped to 86% and became short winded after 5 yards. MD aware.

## 2018-01-02 NOTE — PROGRESS NOTES
TRANSFER - IN REPORT:    [facility] on Mariela Jain  being received from Emergency(unit) for routine progression of care      Report consisted of patients Situation, Background, Assessment and   Recommendations(SBAR). Information from the following report(s) SBAR, Kardex, ED Summary and MAR was reviewed with the receiving nurse. Opportunity for questions and clarification was provided. Assessment completed upon patients arrival to unit and care assumed.

## 2018-01-02 NOTE — H&P
Hospitalist Admission Note    NAME: Jerrlyn Hodgkin   :  1950   MRN:  [id number]     Date/Time:  2018 4:26 PM    Patient PCP: Kimberley Romero MD  ________________________________________________________________________    My assessment of this patient's clinical condition and my plan of care is as follows. Assessment / Plan:    Worsening cancer related symptoms which includes:    Progressive SOB/CABRALES and orthopnea   Chest, back and shoulder pains  Persistent cough  Generalized weakness  Stage 4 Lung cancer   Brain metastasis, s/p recent gamma knife   Bone (rib/spine) metastasis   -tramadol at home did not help. IV morphine in ER did not help. Will switch to IV dilaudid for severe pain and re evaluated in AM  -she has not been able to lay down for several days. May benefit from oxygen at night and with activities. Will monitor her oxygen sats at night and pre / post activity.    -tessalon pearls.   -radiation may be an option at some point. Consult Oncology for input  -consult Palliative care for help with symptom management    COPD  -continue advair. Schedule bs        Code Status:   DNR  Surrogate Decision Maker:   is mPOA    DVT Prophylaxis:  Lovenox  GI Prophylaxis: not indicated    Baseline:   . Independent          Subjective:   CHIEF COMPLAINT:   Difficulty breathing    HISTORY OF PRESENT ILLNESS:     Almas Frausto is a 79 y.o.  female with a history of COPD and recent diagnosis of lung cancer with mets to her bone and brain. She was first diagnosed last month and is now S/P gamma knife on  and her first chemo session 17. This past couple of weeks, she has experienced worsening SOB/CABRALES and orthopnea. It has now gotten to the point that she can't even walk short distances within her house. She has not been able to lay down at night and has been sleeping sitting on a chair.   Her cough has increased but she denies hemoptysis or significant sputum production. She also has been experiencing anterior chest, bilateral shoulder and upper/lower back pain. Some of these pain is worse with breathing and activity or movement. She denies leg swelling, fevers, chills, dizziness, syncope or HA. She presented to the ER and her initial CXR showed Nonspecific interstitial process could represent interstitial edema, development of fibrosis, or an atypical or viral process. Subsequent CTA showed no PE but Extensive worsening of metastatic disease. Significant worsening of mediastinal lymph nodes, mass adjacent to the aortic arch which is continuous with the known left upper lobe neoplasm, multiple bilateral pulmonary nodules, probable lymphangitic spread of tumor, and metastatic lumbar, right rib and right clavicular lesions. Associated narrowing of the left upper lobe pulmonary artery. Bilateral pleural effusions. Possible pathologic fracture of T10    We were asked to admit for work up and evaluation of the above problems.      Past Medical History:   Diagnosis Date    Arthritis     hands    Back pain     Cancer ([facility] [address] 11/06/2017    metastatic lung cancer    Chronic obstructive pulmonary disease (HCC)     Chronic pain     shoulder/back    Diverticulitis     Hypercholesterolemia     Hypertension     IBS (irritable bowel syndrome)     Menopause 1982    Muscle ache         Past Surgical History:   Procedure Laterality Date    CHEST SURGERY PROCEDURE UNLISTED      bronchoscopy    HX APPENDECTOMY      [address] [facility] [address]    umbilical    HX HYSTERECTOMY  1982    HX OOPHORECTOMY Left 1982    HX OOPHORECTOMY Right 1983    HX OTHER [address] [address]    D&C    [address] [place] [address] [place]    partial hysterectomy    HX OTHER SURGICAL  1982    hysterectomy    HX OTHER SURGICAL  1982    breast implants, [facility]    [address] OTHER SURGICAL  2011    implant removal, [facility]    HX OTHER SURGICAL      colonoscopy    HX TONSILLECTOMY  1971    IMPLANT BREAST SILICONE/EQ Bilateral 2623    REMOVAL OF BREAST IMPLANT Bilateral 1989       Social History   Substance Use Topics    Smoking status: Former Smoker     Packs/day: 1.00     Years: 17.00     Quit date: 4/2/1986    Smokeless tobacco: Never Used    Alcohol use No        Family History   Problem Relation Age of Onset    Family history unknown: Yes     Allergies   Allergen Reactions    Demerol [Meperidine] Nausea and Vomiting    Doxycycline Other (comments)     Female infection    Erythromycin Other (comments)     Female infection    Hydrocodone Itching     Dizzy, Headache    Penicillins Other (comments)     From childhood    Percodan [Oxycodone Hcl-Oxycodone-Asa] Nausea and Vomiting     headache    Sulfa (Sulfonamide Antibiotics) Swelling    Tetracycline Other (comments)     Female infection    Xylocaine [Lidocaine Hcl] Nausea Only     headache        Prior to Admission medications    Medication Sig Start Date End Date Taking? Authorizing Provider   albuterol (PROVENTIL HFA, VENTOLIN HFA, PROAIR HFA) 90 mcg/actuation inhaler Take 2 Puffs by inhalation every four (4) hours as needed for Wheezing or Shortness of Breath. Yes Alina Daniels MD   acetaminophen (TYLENOL) 500 mg tablet Take 1,000 mg by mouth every six (6) hours as needed for Pain. Yes Alina Daniels MD   filgrastim-sndz (ZARXIO) 300 mcg/0.5 mL syrg injection 300 mcg by SubCUTAneous route five (5) days a week. Start the day after chemo, take for 5 days   Yes Alina Daniels MD   ondansetron hcl (ZOFRAN) 8 mg tablet Take 8 mg by mouth every eight (8) hours as needed for Nausea. Yes Alina Daniels MD   calcium carbonate-vitamin D3 1,000 mg(2,500 mg)-800 unit tab Take 1 Tab by mouth daily. Yes Alina Daniels MD   traMADol (ULTRAM) 50 mg tablet Take 50 mg by mouth every eight (8) hours as needed for Pain (pain/cough).    Yes Historical Provider   benzonatate (TESSALON) 100 mg capsule Take 100 mg by mouth three (3) times daily as needed for Cough. Yes Historical Provider   folic acid (FOLVITE) 1 mg tablet Take 1 mg by mouth daily. Yes Historical Provider   fluticasone-salmeterol (ADVAIR DISKUS) 250-50 mcg/dose diskus inhaler Take 1 Puff by inhalation two (2) times a day. Yes Historical Provider   ezetimibe (ZETIA) 10 mg tablet Take 10 mg by mouth daily. Yes Historical Provider   fenofibrate (TRICOR) 160 mg tablet Take 160 mg by mouth daily. Yes Historical Provider   MULTIVITAMIN PO Take 1 Tab by mouth daily. Yes Historical Provider   albuterol (PROVENTIL VENTOLIN) 2.5 mg /3 mL (0.083 %) nebulizer solution 2.5 mg by Nebulization route three (3) times daily as needed for Wheezing or Shortness of Breath. Only for emergencies or if an infection sets in    Yes Historical Provider   docusate sodium (COLACE) 100 mg capsule Take 1 Cap by mouth two (2) times a day. May use OTC instead. Prevents constipation from narcotics.  12/22/17   Marco Cisneros MD       REVIEW OF SYSTEMS:       Total of 12 systems reviewed as follows:       POSITIVE= underlined text  Negative = text not underlined  General:  fever, chills, sweats, generalized weakness, weight loss/gain,      loss of appetite   Eyes:    blurred vision, eye pain, loss of vision, double vision  ENT:    rhinorrhea, pharyngitis   Respiratory:   cough, sputum production, SOB, CABRALES, wheezing, pleuritic pain   Cardiology:   chest pain, palpitations, orthopnea, PND, edema, syncope   Gastrointestinal:  abdominal pain , N/V, diarrhea, dysphagia, constipation, bleeding   Genitourinary:  frequency, urgency, dysuria, hematuria, incontinence   Muskuloskeletal :  arthralgia, myalgia, back and shoulder pain  Hematology:  easy bruising, nose or gum bleeding, lymphadenopathy   Dermatological: rash, ulceration, pruritis, color change / jaundice  Endocrine:   hot flashes or polydipsia   Neurological:  headache, dizziness, confusion, focal weakness, paresthesia,     Speech difficulties, memory loss, gait difficulty  Psychological: Feelings of anxiety, depression, agitation    Objective:   VITALS:    Visit Vitals    /62    Pulse (!) 107    Temp 98.1 °F (36.7 °C)    Resp 29    Ht 5' 4.5\" (1.638 m)    Wt 63.5 kg (140 lb)    SpO2 94%    BMI 23.66 kg/m2       PHYSICAL EXAM:    General:    Alert, cooperative, no distress, appears stated age. HEENT: Atraumatic, anicteric sclerae, pink conjunctivae     No oral ulcers, mucosa moist, throat clear, dentition fair  Neck:  Supple, symmetrical,  thyroid: non tender (+) cervical LAD  Lungs:   Diminished BS bases. No Wheezing or Rhonchi. Scattered coarse BS  Chest wall:  No tenderness  No Accessory muscle use. Heart:   Regular  rhythm,  No  murmur   No edema  Abdomen:   Soft, non-tender. Not distended. Bowel sounds normal  Extremities: No cyanosis. No clubbing,  Skin turgor normal, Capillary refill normal, Radial dial pulse 2+  Skin:     Not pale. Not Jaundiced  No rashes   Psych:  Good insight. Not depressed. Not anxious or agitated. Neurologic: EOMs intact. No facial asymmetry. No aphasia or slurred speech. Symmetrical strength, Sensation grossly intact.  Alert and oriented X 4.     _______________________________________________________________________  Care Plan discussed with:    Comments   Patient x    Family  x     RN     Care Manager                    Consultant:      _______________________________________________________________________  Expected  Disposition:   Home with Family x   HH/PT/OT/RN    SNF/LTC    SARAH    ________________________________________________________________________  TOTAL TIME:  54  Minutes    Critical Care Provided     Minutes non procedure based      Comments    x Reviewed previous records   >50% of visit spent in counseling and coordination of care  Discussion with patient and/or family and questions answered       Given the patient's current clinical presentation, I have a high level of concern for decompensation if discharged from the ED. Complex decision making was performed which includes reviewing the patient's available past medical records, laboratory results, and Xray films. I have also directly communicated my plan and discussed this case with the involved ED physician.     ____________________________________________________________________  Myah Miller MD    Procedures: see electronic medical records for all procedures/Xrays and details which were not copied into this note but were reviewed prior to creation of Plan. LAB DATA REVIEWED:    Recent Results (from the past 24 hour(s))   EKG, 12 LEAD, INITIAL    Collection Time: 01/02/18  9:34 AM   Result Value Ref Range    Ventricular Rate 100 BPM    Atrial Rate 100 BPM    P-R Interval 134 ms    QRS Duration 100 ms    Q-T Interval 370 ms    QTC Calculation (Bezet) 477 ms    Calculated P Axis 71 degrees    Calculated R Axis 41 degrees    Calculated T Axis 73 degrees    Diagnosis       Normal sinus rhythm  Possible Inferior infarct , age undetermined  Abnormal ECG  When compared with ECG of 20-DEC-2017 11:33,  Minimal criteria for Anterior infarct are no longer present  Borderline criteria for Inferior infarct are now present  Nonspecific T wave abnormality now evident in Lateral leads  Confirmed by Iron Brewer ([id number]) on 1/2/2018 4:24:03 PM     CBC WITH AUTOMATED DIFF    Collection Time: 01/02/18 10:17 AM   Result Value Ref Range    WBC 17.2 (H) 3.6 - 11.0 K/uL    RBC 4.36 3.80 - 5.20 M/uL    HGB 12.5 11.5 - 16.0 g/dL    HCT 38.0 35.0 - 47.0 %    MCV 87.2 80.0 - 99.0 FL    MCH 28.7 26.0 - 34.0 PG    MCHC 32.9 30.0 - 36.5 g/dL    RDW 14.0 11.5 - 14.5 %    PLATELET 933 817 - 195 K/uL    NEUTROPHILS 77 %    BAND NEUTROPHILS 1 %    LYMPHOCYTES 17 %    MONOCYTES 5 %    EOSINOPHILS 0 %    BASOPHILS 0 %    ABS. NEUTROPHILS 13.4 K/UL    ABS. LYMPHOCYTES 2.9 K/UL    ABS. MONOCYTES 0.9 K/UL    ABS. EOSINOPHILS 0.0 K/UL    ABS. BASOPHILS 0.0 K/UL    RBC COMMENTS NORMOCYTIC, NORMOCHROMIC      DF MANUAL     METABOLIC PANEL, COMPREHENSIVE    Collection Time: 01/02/18 10:17 AM   Result Value Ref Range    Sodium 138 136 - 145 mmol/L    Potassium 3.8 3.5 - 5.1 mmol/L    Chloride 101 97 - 108 mmol/L    CO2 33 (H) 21 - 32 mmol/L    Anion gap 4 (L) 5 - 15 mmol/L    Glucose 105 (H) 65 - 100 mg/dL    BUN 9 6 - 20 MG/DL    Creatinine 0.44 (L) 0.55 - 1.02 MG/DL    BUN/Creatinine ratio 20 12 - 20      GFR est AA >60 >60 ml/min/1.73m2    GFR est non-AA >60 >60 ml/min/1.73m2    Calcium 8.2 (L) 8.5 - 10.1 MG/DL    Bilirubin, total 0.6 0.2 - 1.0 MG/DL    ALT (SGPT) 46 12 - 78 U/L    AST (SGOT) 37 15 - 37 U/L    Alk.  phosphatase 163 (H) 45 - 117 U/L    Protein, total 7.0 6.4 - 8.2 g/dL    Albumin 3.0 (L) 3.5 - 5.0 g/dL    Globulin 4.0 2.0 - 4.0 g/dL    A-G Ratio 0.8 (L) 1.1 - 2.2     CK W/ REFLX CKMB    Collection Time: 01/02/18 10:17 AM   Result Value Ref Range    CK 33 26 - 192 U/L   PROTHROMBIN TIME + INR    Collection Time: 01/02/18 10:17 AM   Result Value Ref Range    INR 1.1 0.9 - 1.1      Prothrombin time 10.8 9.0 - 11.1 sec   MAGNESIUM    Collection Time: 01/02/18 10:17 AM   Result Value Ref Range    Magnesium 2.3 1.6 - 2.4 mg/dL   NT-PRO BNP    Collection Time: 01/02/18 10:17 AM   Result Value Ref Range    NT pro- (H) 0 - 125 PG/ML   TROPONIN I    Collection Time: 01/02/18 10:17 AM   Result Value Ref Range    Troponin-I, Qt. <0.04 <0.05 ng/mL

## 2018-01-03 ENCOUNTER — APPOINTMENT (OUTPATIENT)
Dept: MRI IMAGING | Age: 68
DRG: 543 | End: 2018-01-03
Attending: INTERNAL MEDICINE
Payer: MEDICARE

## 2018-01-03 LAB
ANION GAP SERPL CALC-SCNC: 9 MMOL/L (ref 5–15)
BASOPHILS # BLD: 0 K/UL
BASOPHILS NFR BLD: 0 %
BUN SERPL-MCNC: 7 MG/DL (ref 6–20)
BUN/CREAT SERPL: 19 (ref 12–20)
CALCIUM SERPL-MCNC: 7.4 MG/DL (ref 8.5–10.1)
CHLORIDE SERPL-SCNC: 101 MMOL/L (ref 97–108)
CO2 SERPL-SCNC: 27 MMOL/L (ref 21–32)
CREAT SERPL-MCNC: 0.36 MG/DL (ref 0.55–1.02)
DIFFERENTIAL METHOD BLD: ABNORMAL
EOSINOPHIL # BLD: 0 K/UL
EOSINOPHIL NFR BLD: 0 %
ERYTHROCYTE [DISTWIDTH] IN BLOOD BY AUTOMATED COUNT: 14.3 % (ref 11.5–14.5)
GLUCOSE SERPL-MCNC: 110 MG/DL (ref 65–100)
HCT VFR BLD AUTO: 35.3 % (ref 35–47)
HGB BLD-MCNC: 11.6 G/DL (ref 11.5–16)
LYMPHOCYTES # BLD: 1.6 K/UL
LYMPHOCYTES NFR BLD: 5 %
MCH RBC QN AUTO: 28.6 PG (ref 26–34)
MCHC RBC AUTO-ENTMCNC: 32.9 G/DL (ref 30–36.5)
MCV RBC AUTO: 86.9 FL (ref 80–99)
MONOCYTES # BLD: 1.9 K/UL
MONOCYTES NFR BLD: 6 %
NEUTS BAND NFR BLD MANUAL: 2 %
NEUTS SEG # BLD: 28.9 K/UL
NEUTS SEG NFR BLD: 87 %
PLATELET # BLD AUTO: 218 K/UL (ref 150–400)
POTASSIUM SERPL-SCNC: 3.2 MMOL/L (ref 3.5–5.1)
RBC # BLD AUTO: 4.06 M/UL (ref 3.8–5.2)
RBC MORPH BLD: ABNORMAL
SODIUM SERPL-SCNC: 137 MMOL/L (ref 136–145)
WBC # BLD AUTO: 32.4 K/UL (ref 3.6–11)

## 2018-01-03 PROCEDURE — 65270000015 HC RM PRIVATE ONCOLOGY

## 2018-01-03 PROCEDURE — 94640 AIRWAY INHALATION TREATMENT: CPT

## 2018-01-03 PROCEDURE — 72146 MRI CHEST SPINE W/O DYE: CPT

## 2018-01-03 PROCEDURE — 80048 BASIC METABOLIC PNL TOTAL CA: CPT | Performed by: INTERNAL MEDICINE

## 2018-01-03 PROCEDURE — 74011250637 HC RX REV CODE- 250/637: Performed by: EMERGENCY MEDICINE

## 2018-01-03 PROCEDURE — 74011250636 HC RX REV CODE- 250/636: Performed by: INTERNAL MEDICINE

## 2018-01-03 PROCEDURE — 76450000000

## 2018-01-03 PROCEDURE — 85025 COMPLETE CBC W/AUTO DIFF WBC: CPT | Performed by: INTERNAL MEDICINE

## 2018-01-03 PROCEDURE — 74011250637 HC RX REV CODE- 250/637: Performed by: INTERNAL MEDICINE

## 2018-01-03 PROCEDURE — 36415 COLL VENOUS BLD VENIPUNCTURE: CPT | Performed by: INTERNAL MEDICINE

## 2018-01-03 PROCEDURE — 74011000250 HC RX REV CODE- 250: Performed by: INTERNAL MEDICINE

## 2018-01-03 RX ORDER — LORAZEPAM 2 MG/ML
1 INJECTION INTRAMUSCULAR ONCE
Status: COMPLETED | OUTPATIENT
Start: 2018-01-03 | End: 2018-01-03

## 2018-01-03 RX ADMIN — ENOXAPARIN SODIUM 40 MG: 40 INJECTION SUBCUTANEOUS at 17:14

## 2018-01-03 RX ADMIN — GUAIFENESIN 200 MG: 200 SOLUTION ORAL at 03:54

## 2018-01-03 RX ADMIN — Medication 10 ML: at 21:44

## 2018-01-03 RX ADMIN — Medication 5 ML: at 06:22

## 2018-01-03 RX ADMIN — IPRATROPIUM BROMIDE AND ALBUTEROL SULFATE 3 ML: .5; 3 SOLUTION RESPIRATORY (INHALATION) at 07:55

## 2018-01-03 RX ADMIN — OXYCODONE HYDROCHLORIDE AND ACETAMINOPHEN 1 TABLET: 5; 325 TABLET ORAL at 21:47

## 2018-01-03 RX ADMIN — IPRATROPIUM BROMIDE AND ALBUTEROL SULFATE 3 ML: .5; 3 SOLUTION RESPIRATORY (INHALATION) at 01:14

## 2018-01-03 RX ADMIN — FLUTICASONE FUROATE AND VILANTEROL TRIFENATATE 1 PUFF: 100; 25 POWDER RESPIRATORY (INHALATION) at 08:09

## 2018-01-03 RX ADMIN — BENZONATATE 200 MG: 100 CAPSULE ORAL at 15:32

## 2018-01-03 RX ADMIN — LORAZEPAM 1 MG: 2 INJECTION INTRAMUSCULAR; INTRAVENOUS at 18:13

## 2018-01-03 RX ADMIN — Medication 10 ML: at 15:29

## 2018-01-03 RX ADMIN — OXYCODONE HYDROCHLORIDE AND ACETAMINOPHEN 1 TABLET: 5; 325 TABLET ORAL at 03:54

## 2018-01-03 RX ADMIN — BENZONATATE 200 MG: 100 CAPSULE ORAL at 21:44

## 2018-01-03 RX ADMIN — BENZONATATE 200 MG: 100 CAPSULE ORAL at 08:03

## 2018-01-03 RX ADMIN — IPRATROPIUM BROMIDE AND ALBUTEROL SULFATE 3 ML: .5; 3 SOLUTION RESPIRATORY (INHALATION) at 21:49

## 2018-01-03 RX ADMIN — IPRATROPIUM BROMIDE AND ALBUTEROL SULFATE 3 ML: .5; 3 SOLUTION RESPIRATORY (INHALATION) at 14:20

## 2018-01-03 RX ADMIN — OXYCODONE HYDROCHLORIDE AND ACETAMINOPHEN 1 TABLET: 5; 325 TABLET ORAL at 11:27

## 2018-01-03 NOTE — PROGRESS NOTES
Patient is very nervous about having MRI and \"going into a small tube\". RN contacted Dr. Josefina Hedrick and received order for 1 mg Ativan IV prior to MRI.

## 2018-01-03 NOTE — PROGRESS NOTES
Oncology Nursing Communication Tool  8:11 PM  1/2/2018     Bedside shift change report given to [facility], RN (incoming nurse) by Chayito Sky RN (outgoing nurse) on [facility]. Report included the following information SBAR and Kardex. Shift Summary: arrived on unit via stretcher. Pain is at a 6 which is tolerable. Oncology consult tomorrow. Issues for physician to address: none         Oncology Shift Note   Admission Date 1/2/2018   Admission Diagnosis SOB (shortness of breath)  Intractable pain   Code Status DNR   Consults IP CONSULT TO ONCOLOGY  IP CONSULT TO PALLIATIVE CARE - PROVIDER      Cardiac Monitoring [] Yes [] No      Purposeful Hourly Rounding [] Yes    Jac Score Total Score: 1   Jac score 3 or > [] Bed Alarm [] Avasys [] 1:1 sitter [] Patient refused (Place signed refusal form in chart)      Pain Managed [] Yes [] No    Key Pain Meds             acetaminophen (TYLENOL) 500 mg tablet  (Taking) Take 1,000 mg by mouth every six (6) hours as needed for Pain.    traMADol (ULTRAM) 50 mg tablet  (Taking) Take 50 mg by mouth every eight (8) hours as needed for Pain (pain/cough). Influenza Vaccine Received Flu Vaccine for Current Season (usually Sept-March): Yes           Oxygen needs? [] Room air Oxygen @  []1L    []2L    []3L   []4L    []5L   []6L     Use home O2? [] Yes [] No  Perform O2 challenge test using  smartphrase (.oxygenchallenge)      Last bowel movement    bowel movement      Urinary Catheter             LDAs               Peripheral IV 01/02/18 Right Forearm (Active)   Site Assessment Clean, dry, & intact 1/2/2018 10:25 AM   Dressing Status Clean, dry, & intact 1/2/2018 10:25 AM   Dressing Type Transparent 1/2/2018 10:25 AM   Hub Color/Line Status Pink;Flushed 1/2/2018 10:25 AM   Action Taken Blood drawn 1/2/2018 10:25 AM                         Readmission Risk Assessment Tool Score Low Risk            7       Total Score        2 .  Living with Significant Other. Assisted Living. LTAC. SNF. or   Rehab    5 Pt.  Coverage (Medicare=5 , Medicaid, or Self-Pay=4)        Criteria that do not apply:    Has Seen PCP in Last 6 Months (Yes=3, No=0)    Patient Length of Stay (>5 days = 3)    IP Visits Last 12 Months (1-3=4, 4=9, >4=11)    Charlson Comorbidity Score (Age + Comorbid Conditions)       Expected Length of Stay - - -   Actual Length of Stay 0          Chayito Sky RN

## 2018-01-03 NOTE — ROUTINE PROCESS

## 2018-01-03 NOTE — PROGRESS NOTES
Hospitalist Progress Note    NAME: Tyrell Smith   :  1950   MRN:  [id number]       Assessment / Plan:  Progressive SOB/CABRALES and orthopnea   Worsening Chest, back and shoulder pains  Persistent cough  Generalized weakness   In settings of Stage 4 Lung cancer (worsening mets on CT scan)  Brain metastasis, s/p recent gamma knife   Bone (rib/spine) metastasis   Tramadol at home did not help  Continue PRN IV dilaudid for now  Scheduled tessalon pearls  Awaiting Palliative care and oncology input     COPD  Continue Px inhaler. Schedule duonebs     Code Status:   DNR  Surrogate Decision Maker:   is mPOA     DVT Prophylaxis:  Lovenox  GI Prophylaxis: not indicated     Baseline:   . Independent                              Subjective: Pt seen and examined at bedside. Claims to have breathing better with nebs. Overnight events d/w RN   CHIEF COMPLAINT: f/u \"Difficulty breathing\"     Review of Systems:  Symptom Y/N Comments  Symptom Y/N Comments   Fever/Chills n   Chest Pain y Pleuritic   Poor Appetite    Edema     Cough y   Abdominal Pain     Sputum    Joint Pain     SOB/CABRALES y   Pruritis/Rash     Nausea/vomit    Tolerating PT/OT     Diarrhea    Tolerating Diet y    Constipation    Other       Could NOT obtain due to:      Objective:     VITALS:   Last 24hrs VS reviewed since prior progress note.  Most recent are:  Patient Vitals for the past 24 hrs:   Temp Pulse Resp BP SpO2   18 0756 - - - - 93 %   18 0720 97.8 °F (36.6 °C) 87 18 123/43 94 %   18 0315 98.6 °F (37 °C) 99 18 134/42 93 %   18 2258 98.3 °F (36.8 °C) 92 16 114/81 94 %   18 1945 98.2 °F (36.8 °C) (!) 109 18 134/52 94 %   18 1850 100.4 °F (38 °C) (!) 107 18 115/51 95 %   18 1747 - (!) 106 30 - -   18 1705 - (!) 115 21 - 94 %   18 1651 - (!) 109 30 - 95 %   18 1630 - (!) 108 (!) 32 136/62 94 %   18 1626 - (!) 105 (!) 31 - 95 %   18 1623 - (!) 108 (!) 35 - 95 % 01/02/18 1600 - (!) 107 29 143/62 94 %   01/02/18 1555 - (!) 107 (!) 31 - 94 %   01/02/18 1545 - (!) 105 (!) 33 136/57 95 %   01/02/18 1537 - (!) 108 28 - 95 %   01/02/18 1535 - (!) 113 22 - 96 %   01/02/18 1530 - (!) 106 26 150/44 97 %   01/02/18 1515 - (!) 107 (!) 33 139/68 94 %   01/02/18 1500 - (!) 106 (!) 33 123/56 95 %   01/02/18 1455 - (!) 106 (!) 33 - 94 %   01/02/18 1451 - (!) 108 21 - 96 %   01/02/18 1445 - (!) 104 (!) 31 (!) 132/32 94 %   01/02/18 1441 - (!) 110 (!) 33 - 93 %   01/02/18 1422 - (!) 104 26 - 93 %   01/02/18 1415 - (!) 106 29 144/57 93 %   01/02/18 1400 - (!) 102 25 141/51 93 %   01/02/18 1345 - 99 26 138/52 95 %   01/02/18 1330 - 100 28 135/51 95 %   01/02/18 1315 - (!) 102 28 140/47 95 %   01/02/18 1303 - (!) 103 30 - 95 %   01/02/18 1300 - (!) 104 14 140/50 96 %   01/02/18 1259 - (!) 103 (!) 36 - 96 %   01/02/18 1254 - - - 164/56 -   01/02/18 1244 - (!) 103 25 - 96 %   01/02/18 1239 - 100 29 - 96 %   01/02/18 1230 - (!) 101 24 136/57 96 %   01/02/18 1215 - (!) 101 (!) 32 113/82 96 %   01/02/18 1202 - - - 150/50 -   01/02/18 1130 - 97 21 (!) 130/101 96 %   01/02/18 1124 - 98 27 133/54 95 %   01/02/18 1115 - 97 26 139/49 96 %   01/02/18 1100 - 99 30 157/57 95 %   01/02/18 1045 - (!) 107 26 149/55 96 %   01/02/18 1042 - - - 159/54 -   01/02/18 1030 98.1 °F (36.7 °C) 100 30 132/59 95 %   01/02/18 1028 - 99 25 - 96 %     No intake or output data in the 24 hours ending 01/03/18 1022     PHYSICAL EXAM:  General: WD, WN. Alert, cooperative, no acute distress    EENT:  EOMI. Anicteric sclerae. MMM  Resp:  CTA bilaterally, no wheezing or rales. No accessory muscle use  CV:  Regular  rhythm,  No edema  GI:  Soft, Non distended, Non tender.  +Bowel sounds  Neurologic:  Alert and oriented X 3, normal speech,   Psych:   Good insight. Not anxious nor agitated  Skin:  No rashes.   No jaundice    Reviewed most current lab test results and cultures  YES  Reviewed most current radiology test results YES  Review and summation of old records today    NO  Reviewed patient's current orders and MAR    YES  PMH/SH reviewed - no change compared to H&P  ________________________________________________________________________  Care Plan discussed with:    Comments   Patient y    Family      RN y    Care Manager     Consultant                        Multidiciplinary team rounds were held today with , nursing, pharmacist and clinical coordinator. Patient's plan of care was discussed; medications were reviewed and discharge planning was addressed. ________________________________________________________________________  Total NON critical care TIME:  35  Minutes    Total CRITICAL CARE TIME Spent:   Minutes non procedure based      Comments   >50% of visit spent in counseling and coordination of care     ________________________________________________________________________  Kortney San MD     Procedures: see electronic medical records for all procedures/Xrays and details which were not copied into this note but were reviewed prior to creation of Plan. LABS:  I reviewed today's most current labs and imaging studies.   Pertinent labs include:  Recent Labs      01/03/18   0408  01/02/18   1017   WBC  32.4*  17.2*   HGB  11.6  12.5   HCT  35.3  38.0   PLT  218  243     Recent Labs      01/03/18   0408  01/02/18   1017   NA  137  138   K  3.2*  3.8   CL  101  101   CO2  27  33*   GLU  110*  105*   BUN  7  9   CREA  0.36*  0.44*   CA  7.4*  8.2*   MG   --   2.3   ALB   --   3.0*   TBILI   --   0.6   SGOT   --   37   ALT   --   46   INR   --   1.1       Signed: Kortney San MD

## 2018-01-03 NOTE — CONSULTS
Palliative Medicine Consult  Ben: [phone] ([phone])    Patient Name: Wendy Barron  YOB: 1950    Date of Initial Consult: 1/3/17  Reason for Consult: overwhelming symptoms  Requesting Provider: Preston Magdaleno MD  Primary Care Physician: Raimundo Lau MD     SUMMARY:   Wendy Barron is a 79 y.o. female with a past history of COPD, recent diagnosis of lung cancer with mets to bone and brain s/p gamma knife on 12/19 and first chemo on  12/28, who was admitted on 1/2/2018 from  Home with  Sob and cancer associated pain . Current medical issues leading to Palliative Medicine involvement include: overwhelming symptoms. Lives at home with her , independent , drives , has 4 childern. Retired from [employer]. CTA 1/2/17 :   Extensive worsening of metastatic disease. Significant worsening of  mediastinal lymph nodes, mass adjacent to the aortic arch which is continuous  with the known left upper lobe neoplasm, multiple bilateral pulmonary nodules,  probable lymphangitic spread of tumor, and metastatic lumbar, right rib and  right clavicular lesions. Associated narrowing of the left upper lobe pulmonary  artery. 3. Bilateral pleural effusions. 4. Possible pathologic fracture of T10. PALLIATIVE DIAGNOSES:   1. Sob   2. Cancer associated pain   3. Cough   4. Generalized weakness  5. Rapidly progressive metastatic lung cancer. PLAN:   Visited patient with her step son Toña Duffy in morning. Met with the  later in afternoon , explained palliative care service and care plan. 1. Pain : improved, patient notes percocet helps most with pain , only required two doses at 0300 and 1100, we discuss to schedule it every 6 hrs, patient wants to keep it as on needed basis. Continue with I/V dilaudid 1 mg q 4 hrs prn on as needed basis. Educated on side effects of opioids, specifically constipation.     2.Dyspnea: patient notes she is not feeling tightness in chest, nebulizer and inhaler are helping.   ng, still has few episodes of irritating cough , not as severe. 3. Weight loss/ anorexia: cancer related,  Patient is trying to take glucerna, will get dietician on board. 4. Metallic taste in mouth: related to medication/chemo, and cancer, advised attention to oral hygiene. We will continue to support . Initial consult note routed to primary continuity provider  Communicated plan of care with: Palliative IDT, bed side RN, Neel Chiu . GOALS OF CARE / TREATMENT PREFERENCES:     GOALS OF CARE:  Patient/Health Care Proxy Stated Goals: Other (comment) (improve pain and sob and get back to base line function.)      TREATMENT PREFERENCES:   Code Status: DNR    Advance Care Planning:  Advance Care Planning 1/2/2018   Patient's Healthcare Decision Maker is: Legal Next of Kin   Primary Decision Maker Name Neri James   Primary Decision Maker Phone Number -   Confirm Advance Directive Yes, not on file       Medical Interventions: Full interventions   Other Instructions: Other:    As far as possible, the palliative care team has discussed with patient / health care proxy about goals of care / treatment preferences for patient. HISTORY:     History obtained from: chart and patient  CHIEF COMPLAINT: admitted for sob and pain . HPI/SUBJECTIVE:    The patient is:   [] Verbal and participatory   progressive sob x couple of months with episode of cough and \" short winded \", sleeps in recliner at home. Bilateral shoulder pain x couple of months, takes tramadol and percocet at home , percocet helps. currently denies any pain . Sob improved with nebulizer and inhaler. Appetite is decreased , weight loss of 25 lbs in one month, metallic taste in mouth, takes glucerna and eat vegetables mostly . Per nurse patient had loose bowel movement today.   Clinical Pain Assessment (nonverbal scale for severity on nonverbal patients):   Clinical Pain Assessment  Severity: 4  Location: both shoulders, upper back  Character: dull  Duration: months  Effect: cannot lie flat, sleeps in recliner  Factors: percocet helps. Frequency: constant          Duration: for how long has pt been experiencing pain (e.g., 2 days, 1 month, years)  Frequency: how often pain is an issue (e.g., several times per day, once every few days, constant)     FUNCTIONAL ASSESSMENT:     Palliative Performance Scale (PPS):  PPS: 50       PSYCHOSOCIAL/SPIRITUAL SCREENING:     Palliative IDT has assessed this patient for cultural preferences / practices and a referral made as appropriate to needs (Cultural Services, Patient Advocacy, Ethics, etc.)    Advance Care Planning:  Advance Care Planning 1/2/2018   Patient's Healthcare Decision Maker is: Legal Next of Nilay Ricardo   Primary Decision Maker Name Luz Oakley   Primary Decision Maker Phone Number -   Confirm Advance Directive Yes, not on file       Any spiritual / Synagogue concerns:  [] Yes /  [] No    Caregiver Burnout:  [] Yes /  [] No /  [] No Caregiver Present      Anticipatory grief assessment:   [] Normal  / [] Maladaptive       ESAS Anxiety: Anxiety: 2    ESAS Depression: Depression: 0        REVIEW OF SYSTEMS:     Positive and pertinent negative findings in ROS are noted above in HPI. The following systems were [x] reviewed / [] unable to be reviewed as noted in HPI  Other findings are noted below. Systems: constitutional, ears/nose/mouth/throat, respiratory, gastrointestinal, genitourinary, musculoskeletal, integumentary, neurologic, psychiatric, endocrine. Positive findings noted below.   Modified ESAS Completed by: provider   Fatigue: 5 Drowsiness: 0   Depression: 0 Pain: 4   Anxiety: 2 Nausea: 2   Anorexia: 5 Dyspnea: 2     Constipation: No     Stool Occurrence(s): 1        PHYSICAL EXAM:     From RN flowsheet:  Wt Readings from Last 3 Encounters:   01/02/18 140 lb (63.5 kg)   01/03/18 140 lb (63.5 kg)   12/22/17 154 lb 1.6 oz (69.9 kg) Blood pressure 125/64, pulse (!) 103, temperature 98.3 °F (36.8 °C), resp. rate 18, height 5' 4.5\" (1.638 m), weight 140 lb (63.5 kg), SpO2 94 %. Pain Scale 1: Numeric (0 - 10)  Pain Intensity 1: 0     Pain Location 1: Back  Pain Orientation 1: Left, Right  Pain Description 1: Aching  Pain Intervention(s) 1: Medication (see MAR)  Last bowel movement, if known:     Constitutional: sitting in bed, not in any distress.   Eyes: pupils equal, anicteric  ENMT: no nasal discharge, moist mucous membranes  Cardiovascular: regular rhythm, distal pulses intact  Respiratory: breathing not labored, symmetric  Gastrointestinal: soft non-tender, +bowel sounds  Musculoskeletal: no deformity, no tenderness to palpation  Skin: warm, dry  Neurologic: following commands, moving all extremities  Psychiatric: full affect, no hallucinations  Other:       HISTORY:     Active Problems:    SOB (shortness of breath) (1/2/2018)      Intractable pain (1/2/2018)      Past Medical History:   Diagnosis Date    Arthritis     hands    Back pain     Cancer ([facility] [address] 11/06/2017    metastatic lung cancer    Chronic obstructive pulmonary disease (HCC)     Chronic pain     shoulder/back    Diverticulitis     Hypercholesterolemia     Hypertension     IBS (irritable bowel syndrome)     Menopause 1982    Muscle ache       Past Surgical History:   Procedure Laterality Date    CHEST SURGERY PROCEDURE UNLISTED      bronchoscopy    HX APPENDECTOMY      [id number] [facility] , [address] Myrna Hawthorne  [id number]    umbilical    HX HYSTERECTOMY  1982    HX OOPHORECTOMY Left 1982    HX OOPHORECTOMY Right 1983    [facility]    D&C    [address] [place] [address] [place]    partial hysterectomy    HX OTHER SURGICAL  1982    hysterectomy    HX OTHER SURGICAL  1982    breast implants, [facility]    HX OTHER SURGICAL  2011    implant removal, [facility]    HX OTHER SURGICAL      colonoscopy    HX TONSILLECTOMY  1971    IMPLANT BREAST SILICONE/EQ Bilateral 1053    REMOVAL OF BREAST IMPLANT Bilateral 1989      Family History   Problem Relation Age of Onset    Family history unknown: Yes      History reviewed, no pertinent family history. Social History   Substance Use Topics    Smoking status: Former Smoker     Packs/day: 1.00     Years: 17.00     Quit date: 4/2/1986    Smokeless tobacco: Never Used    Alcohol use No     Allergies   Allergen Reactions    Latex, Natural Rubber Itching     Per patient she experienced itching after pulling off latex tape post surgery.      Demerol [Meperidine] Nausea and Vomiting    Doxycycline Other (comments)     Female infection    Erythromycin Other (comments)     Female infection    Hydrocodone Itching     Dizzy, Headache    Penicillins Other (comments)     From childhood    Percodan [Oxycodone Hcl-Oxycodone-Asa] Nausea and Vomiting     headache    Sulfa (Sulfonamide Antibiotics) Swelling    Tetracycline Other (comments)     Female infection    Xylocaine [Lidocaine Hcl] Nausea Only     headache      Current Facility-Administered Medications   Medication Dose Route Frequency    fluticasone-vilanterol (BREO ELLIPTA) 100mcg-25mcg/puff  1 Puff Inhalation DAILY    sodium chloride (NS) flush 5-10 mL  5-10 mL IntraVENous Q8H    sodium chloride (NS) flush 5-10 mL  5-10 mL IntraVENous PRN    acetaminophen (TYLENOL) tablet 650 mg  650 mg Oral Q4H PRN    oxyCODONE-acetaminophen (PERCOCET) 5-325 mg per tablet 1 Tab  1 Tab Oral Q4H PRN    naloxone (NARCAN) injection 0.4 mg  0.4 mg IntraVENous PRN    ondansetron (ZOFRAN) injection 4 mg  4 mg IntraVENous Q4H PRN    enoxaparin (LOVENOX) injection 40 mg  40 mg SubCUTAneous Q24H    diphenhydrAMINE (BENADRYL) injection 25 mg  25 mg IntraVENous Q6H PRN    albuterol-ipratropium (DUO-NEB) 2.5 MG-0.5 MG/3 ML  3 mL Nebulization Q6H RT    albuterol-ipratropium (DUO-NEB) 2.5 MG-0.5 MG/3 ML  3 mL Nebulization Q4H PRN    benzonatate (TESSALON) capsule 200 mg  200 mg Oral TID    HYDROmorphone (DILAUDID) injection 1 mg  1 mg IntraVENous Q4H PRN    guaiFENesin (ROBITUSSIN) 100 mg/5 mL oral liquid 200 mg  200 mg Oral Q4H PRN          LAB AND IMAGING FINDINGS:     Lab Results   Component Value Date/Time    WBC 32.4 01/03/2018 04:08 AM    HGB 11.6 01/03/2018 04:08 AM    PLATELET 627 47/68/1844 04:08 AM     Lab Results   Component Value Date/Time    Sodium 137 01/03/2018 04:08 AM    Potassium 3.2 01/03/2018 04:08 AM    Chloride 101 01/03/2018 04:08 AM    CO2 27 01/03/2018 04:08 AM    BUN 7 01/03/2018 04:08 AM    Creatinine 0.36 01/03/2018 04:08 AM    Calcium 7.4 01/03/2018 04:08 AM    Magnesium 2.3 01/02/2018 10:17 AM      Lab Results   Component Value Date/Time    AST (SGOT) 37 01/02/2018 10:17 AM    Alk. phosphatase 163 01/02/2018 10:17 AM    Protein, total 7.0 01/02/2018 10:17 AM    Albumin 3.0 01/02/2018 10:17 AM    Globulin 4.0 01/02/2018 10:17 AM     Lab Results   Component Value Date/Time    INR 1.1 01/02/2018 10:17 AM    Prothrombin time 10.8 01/02/2018 10:17 AM      No results found for: IRON, FE, TIBC, IBCT, PSAT, FERR   No results found for: PH, PCO2, PO2  No components found for: GLPOC   No results found for: CPK, CKMB             Total time:   Counseling / coordination time, spent as noted above:   > 50% counseling / coordination?:     Prolonged service was provided for  []30 min   []75 min in face to face time in the presence of the patient, spent as noted above. Time Start:   Time End:   Note: this can only be billed with 96771 (initial) or 53526 (follow up). If multiple start / stop times, list each separately.

## 2018-01-03 NOTE — PROGRESS NOTES
Initial Nutrition Assessment:    INTERVENTIONS/RECOMMENDATIONS:   · Continue regular diet  · Glucerna Shakes TID (chocolate)  · Monitor lytes for refeeding    ASSESSMENT:   Chart reviewed, medically noted for Stage 4 lung cancer with mets to brain and bones and PMH shown below. Pt reports of at least 20 lbs weight loss (12.5%) in the past month due to altered taste. She does like chocolate Glucerna shakes and is consuming BID. Due to poor PO intake and she was encouraged to increase to TID. Shakes will also be added to each meal tray. Due to minimal PO intake and severe weight loss pt is at risk for refeeding syndrome, monitor and replete lytes as needed. Pt meets ASPEN criteria for acute severe malnutrition, see below. Meets Criteria for Acute Malnutrition   [x] Severe Malnutrition, as evidenced by:   [] Moderate muscle wasting, loss of subcutaneous fat   [x] Nutritional intake of <50% of recommended intake for >5 days   [x] Weight loss of >1-2% in 1 week, >5% in 1 month, >7.5% in 3 months, or >10% in 6 months   [] Moderate-severe edema   []Moderate Malnutrition, as evidenced by:   [] Mild muscle wasting, loss of subcutaneous fat   [] Nutritional intake <75% of recommended intake for >1 week   [] Weight loss of 1-2% in 1 week, 5% in 1 month, 7.5% in 3 months, or 10% in 6 months   [] Mild edema          Past Medical History:   Diagnosis Date    Arthritis     hands    Back pain     Cancer ([facility] [address] 11/06/2017    metastatic lung cancer    Chronic obstructive pulmonary disease (HCC)     Chronic pain     shoulder/back    Diverticulitis     Hypercholesterolemia     Hypertension     IBS (irritable bowel syndrome)     Menopause 1982    Muscle ache        Diet Order: Regular  % Eaten:  No data found.     Pertinent Medications: [x]Reviewed:   Pertinent Labs: [x]Reviewed: K+ 3.2  Food Allergies: []NKFA  []Other  [place] (hives)  Last BM:   Edema:        []RUE   []LUE   []RLE   []LLE      Pressure Injury:      [] Stage I   [] Stage II   [] Stage III   [] Stage IV      Wt Readings from Last 30 Encounters:   01/02/18 63.5 kg (140 lb)   12/22/17 69.9 kg (154 lb 1.6 oz)   12/20/17 69.9 kg (154 lb 1.6 oz)   12/07/17 72.6 kg (160 lb)   12/04/17 71.9 kg (158 lb 9.6 oz)   11/20/17 73.5 kg (162 lb)   09/10/14 76.9 kg (169 lb 8 oz)   08/13/14 78 kg (172 lb)   08/13/14 78.3 kg (172 lb 9.9 oz)   04/02/14 83.9 kg (185 lb)       Anthropometrics:   Height: 5' 4.5\" (163.8 cm) Weight: 63.5 kg (140 lb)   IBW (%IBW):   ( ) UBW (%UBW):   (  %)   Last Weight Metrics:  Weight Loss Metrics 1/2/2018 12/22/2017 12/20/2017 12/7/2017 12/4/2017 11/20/2017 9/10/2014   Today's Wt 140 lb 154 lb 1.6 oz 154 lb 1.6 oz 160 lb 158 lb 9.6 oz 162 lb 169 lb 8 oz   BMI 23.66 kg/m2 24.87 kg/m2 24.87 kg/m2 26.63 kg/m2 26.39 kg/m2 27.81 kg/m2 27.37 kg/m2       BMI: Body mass index is 23.66 kg/(m^2). This BMI is indicative of:   []Underweight    [x]Normal    []Overweight    [] Obesity   [] Extreme Obesity (BMI>40)     Estimated Nutrition Needs (Based on):   1650 Kcals/day (BMR: 1175 x 1.4) , 65 g (1 g/kg) Protein  Carbohydrate:  At Least 130 g/day  Fluids: 1650 mL/day (1ml/kcal) or per primary team    NUTRITION DIAGNOSES:   Problem:  Inadequate protein-energy intake      Etiology: related to altered taste     Signs/Symptoms: as evidenced by 12.5% wt loss x 1 month      NUTRITION INTERVENTIONS:  Meals/Snacks: General/healthful diet   Supplements: Commercial supplement              GOAL:   consume >25% of meals and 75% of ONS in 2-4 days    LEARNING NEEDS (Diet, Food/Nutrient-Drug Interaction):    [x] None Identified   [] Identified and Education Provided/Documented   [] Identified and Pt declined/was not appropriate     Cultureal, [place], OR Ethnic Dietary Needs:    [x] None Identified   [] Identified and Addressed     [x] Interdisciplinary Care Plan Reviewed/Documented    [x] Discharge Planning:  Kcal and protein dense foods, Glucerna shakes 3-4x per day MONITORING /EVALUATION:      Food/Nutrient Intake Outcomes:  Total energy intake  Physical Signs/Symptoms Outcomes: Weight/weight change, Electrolyte and renal profile, GI    NUTRITION RISK:    [x] High              [] Moderate           []  Low  []  Minimal/Uncompromised    PT SEEN FOR:    []  MD Consult: []Calorie Count      []Diabetic Diet Education        []Diet Education     []Electrolyte Management     []General Nutrition Management and Supplements     []Management of Tube Feeding     []TPN Recommendations    [x]  RN Referral:  [x]MST score >=2     []Enteral/Parenteral Nutrition PTA     []Pregnant: Gestational DM or Multigestation     []Pressure Ulcer/Wound Care needs        []  Low BMI  []  LOS Referral       Brittany Wright RDN  Pager [phone]  Weekend Pager [phone]

## 2018-01-03 NOTE — CONSULTS
[address] [facility] [place]  MR#: [id number]  : 1950  ACCOUNT #: [de-identified]   DATE OF SERVICE: 2018    REASON FOR CONSULTATION:  Stage IV nonsmall cell lung cancer. REFERRING PHYSICIAN:  Sriram Bangura MD.     HISTORY OF PRESENT ILLNESS:  The patient is a 79-year-old white female, is a patient of one of my partners, Dr. Diana Altman. Dr. Diana Altman is treating her for metastatic nonsmall cell lung cancer. She has known brain metastases. She received gamma knife therapy to that. She just started on chemotherapy last week with carboplatin and Alimta. Plan is to add in Avastin with the second cycle. She came in yesterday with some shortness of breath and some chest pain and some upper back pain and shoulders. She had a CTA of her chest that did not show any PE, but showed worsening of her disease since two months ago. Like I said, she just started chemotherapy last week. There was also a possible pathologic fracture of T10. The patient was admitted to the hospital.  She has been getting pain medications. She says the Percocet seemed to help the most.  Palliative care has been consulted to help with the pain as well and we were asked to see her for further evaluation. The patient did take Granix at home, which is why her white blood cell count is elevated. PAST MEDICAL HISTORY:  Significant for stage IV nonsmall cell lung cancer as above, COPD, hypertension, and arthritis. MEDICATIONS:  She is on DuoNebs, Tessalon Perles, Lovenox prophylactic dosages, Breo Ellipta, Percocet p.r.n. for pain. She also has some IV Dilaudid p.r.n. for pain, but she said the Percocet seemed to help the most.     ALLERGIES:   1. LATEX, NATURAL RUBBER. 2.  DEMEROL (MEPERIDINE). 3.  DOXYCYCLINE. 4.  ERYTHROMYCIN. 5.  HYDROCODONE. 6.  PENICILLINS. 7.  PERCODAN (OXYCODONE HcI-OXYCODONE-ASA). 8.  SULFA (SULFONAMIDE ANTIBIOTICS). 9.  TETRACYCLINE.   10. XYLOCAINE (LIDOCAINE HcI)    REVIEW OF SYSTEMS:  Twelve point systems done and negative except for as above. PHYSICAL EXAMINATION:  VITAL SIGNS:  Temperature 97.8, pulse 87, blood pressure 123/43, respirations 18, satting 94% on room air. GENERAL:  Lying in bed in no acute distress. HEENT:  Normocephalic, atraumatic. Extraocular muscles are intact. Oropharynx clear, without lesion. NECK:  Supple. No cervical or supraclavicular lymphadenopathy appreciated. CARDIOVASCULAR:  Regular rate and rhythm. LUNGS:  Clear to auscultation bilaterally. ABDOMEN:  Normoactive bowel sounds, soft, nontender, nondistended. No hepatosplenomegaly. EXTREMITIES:  No clubbing, cyanosis or edema. NEUROLOGIC:  Nonfocal.    LABORATORY DATA:  White blood cell count 32.4, hemoglobin 11.6, platelets 187. Chemistry sodium 137, BUN 7, creatinine 0.36, calcium 7.4, albumin is low at 3.0, total bilirubin 0.6, alkaline phosphatase 163, ALT 46, AST 37. IMPRESSION AND PLAN:  The patient is a 15-year-old white female with a newly diagnosed stage IV nonsmall cell lung cancer. She had brain metastases that were treated with gamma knife. She just started treatment last week. Her CT scan is compared to a CT scan from two months ago. Her disease has definitely progressed, but she has only had one cycle of the chemo and it started last week. It is too early to say if it is helping. At this point, we will plan on continuing with the chemotherapy. She will be due for it in a couple more weeks. She will continue with the carboplatin, Alimta and adding Avastin per the plan from Dr. Liban Harper. Palliative care is going to see her to help with pain management. The Percocet seemed to be helping. I am going to get an MRI of her thoracic spine given the findings on the CT scan and the fact that she is having some pain there and make sure that looks okay, we will continue to follow along with you.   Her white count is elevated because she was taking Granix at home to increase her white blood cells. We will continue to follow along with you and make further recommendations as needed.       MD DEE Valladares / MONIKA  D: 01/03/2018 11:36     T: 01/03/2018 12:01  JOB #: [id number]

## 2018-01-03 NOTE — PROGRESS NOTES
ADULT PROTOCOL: JET AEROSOL ASSESSMENT    Patient  Kathi Butcher     79 y.o.   female     1/3/2018  9:08 AM    Breath Sounds Pre Procedure:   clear                                    Breath Sounds Post Procedure:  clear                                      Breathing pattern: Pre procedure Breathing Pattern: Regular          Post procedure Breathing Pattern: Regular    Heart Rate: Pre procedure Pulse: 86           Post procedure Pulse: 80    Resp Rate: Pre procedure Respirations: 18           Post procedure Respirations: 18      Cough: Pre procedure Cough: Non-productive               Post procedure   Non productive        Oxygen: O2 Device: Room air        Changed: No    SpO2: Pre procedure SpO2: 93 %                 Post procedure SpO2: 94 %      Nebulizer Therapy: Current medications Aerosolized Medications: DuoNeb Q6hrs      Changed: NO    Smoking History:  Former    Problem List:   Patient Active Problem List   Diagnosis Code    SOB (shortness of breath) R06.02    Intractable pain R52       Respiratory Therapist: RT [place]

## 2018-01-04 VITALS
WEIGHT: 140 LBS | BODY MASS INDEX: 23.32 KG/M2 | RESPIRATION RATE: 18 BRPM | SYSTOLIC BLOOD PRESSURE: 128 MMHG | HEIGHT: 65 IN | HEART RATE: 99 BPM | TEMPERATURE: 98.2 F | OXYGEN SATURATION: 94 % | DIASTOLIC BLOOD PRESSURE: 57 MMHG

## 2018-01-04 PROCEDURE — 94640 AIRWAY INHALATION TREATMENT: CPT

## 2018-01-04 PROCEDURE — 74011250637 HC RX REV CODE- 250/637: Performed by: INTERNAL MEDICINE

## 2018-01-04 PROCEDURE — 74011000250 HC RX REV CODE- 250: Performed by: INTERNAL MEDICINE

## 2018-01-04 RX ORDER — OXYCODONE AND ACETAMINOPHEN 5; 325 MG/1; MG/1
1 TABLET ORAL
Qty: 60 TAB | Refills: 0 | Status: SHIPPED | OUTPATIENT
Start: 2018-01-04 | End: 2018-03-06

## 2018-01-04 RX ORDER — BENZONATATE 100 MG/1
200 CAPSULE ORAL
Qty: 90 CAP | Refills: 0 | Status: SHIPPED | OUTPATIENT
Start: 2018-01-04 | End: 2018-03-06

## 2018-01-04 RX ADMIN — IPRATROPIUM BROMIDE AND ALBUTEROL SULFATE 3 ML: .5; 3 SOLUTION RESPIRATORY (INHALATION) at 09:08

## 2018-01-04 RX ADMIN — IPRATROPIUM BROMIDE AND ALBUTEROL SULFATE 3 ML: .5; 3 SOLUTION RESPIRATORY (INHALATION) at 04:30

## 2018-01-04 RX ADMIN — Medication 5 ML: at 06:00

## 2018-01-04 RX ADMIN — BENZONATATE 200 MG: 100 CAPSULE ORAL at 09:04

## 2018-01-04 RX ADMIN — FLUTICASONE FUROATE AND VILANTEROL TRIFENATATE 1 PUFF: 100; 25 POWDER RESPIRATORY (INHALATION) at 09:04

## 2018-01-04 RX ADMIN — OXYCODONE HYDROCHLORIDE AND ACETAMINOPHEN 1 TABLET: 5; 325 TABLET ORAL at 07:19

## 2018-01-04 NOTE — DISCHARGE SUMMARY
Hospitalist Discharge Summary     Patient ID:  Kaleigh Gomez  [id number]  8 y.o.  1950    PCP on record: Francy Mckeon MD    Admit date: 1/2/2018  Discharge date and time: 1/4/2018      DISCHARGE DIAGNOSIS:  Progressive SOB/CABRALES and orthopnea   Worsening Chest, back and shoulder pains  Persistent cough  Generalized weakness   Stage 4 Lung cancer (worsening mets on CT scan)  Brain metastasis, s/p recent gamma knife   Bone (rib/spine) metastasis   COPD      CONSULTATIONS:  IP CONSULT TO ONCOLOGY  IP CONSULT TO PALLIATIVE CARE - PROVIDER    Excerpted HPI from H&P of Consuelo Best MD:  Mel Christine is a 79 y.o.  female with a history of COPD and recent diagnosis of lung cancer with mets to her bone and brain. She was first diagnosed last month and is now S/P gamma knife on 12/19 and her first chemo session 12/28/17. This past couple of weeks, she has experienced worsening SOB/CABRALES and orthopnea. It has now gotten to the point that she can't even walk short distances within her house. She has not been able to lay down at night and has been sleeping sitting on a chair. Her cough has increased but she denies hemoptysis or significant sputum production. She also has been experiencing anterior chest, bilateral shoulder and upper/lower back pain. Some of these pain is worse with breathing and activity or movement. She denies leg swelling, fevers, chills, dizziness, syncope or HA. She presented to the ER and her initial CXR showed Nonspecific interstitial process could represent interstitial edema, development of fibrosis, or an atypical or viral process. Subsequent CTA showed no PE but Extensive worsening of metastatic disease.  Significant worsening of mediastinal lymph nodes, mass adjacent to the aortic arch which is continuous with the known left upper lobe neoplasm, multiple bilateral pulmonary nodules, probable lymphangitic spread of tumor, and metastatic lumbar, right rib and right clavicular lesions. Associated narrowing of the left upper lobe pulmonary artery. Bilateral pleural effusions. Possible pathologic fracture of T10     We were asked to admit for work up and evaluation of the above problems. ______________________________________________________________________  DISCHARGE SUMMARY/HOSPITAL COURSE:  for full details see H&P, daily progress notes, labs, consult notes. Progressive SOB/CABRALES and orthopnea   Worsening Chest, back and shoulder pains  Persistent cough  Generalized weakness   In settings of Stage 4 Lung cancer (worsening mets on CT scan)  Brain metastasis, s/p recent gamma knife   Bone (rib/spine) metastasis   Tramadol at home did not help  Continue percocet as helping at this time  Tessalon pearls, Nebs  Appreciate Palliative care and oncology input, recommended close followup with dr Sharri Roman as an OP  MRI spine without compression fx but positive for mets  Pt found to be hypoxic 88% on RA while walking, symptomatic, Will arrange home O2. Pt understands the need of O2.     COPD  Continue Px inhaler.  duonebs  _______________________________________________________________________  Patient seen and examined by me on discharge day. Pertinent Findings:  Gen:    Not in distress  Chest: Clear lungs  CVS:   Regular rhythm. No edema  Abd:  Soft, not distended, not tender  Neuro:  Alert, non focal  _______________________________________________________________________  DISCHARGE MEDICATIONS:   Current Discharge Medication List      START taking these medications    Details   oxyCODONE-acetaminophen (PERCOCET) 5-325 mg per tablet Take 1 Tab by mouth every four (4) hours as needed. Max Daily Amount: 6 Tabs.  Indications: Pain  Qty: 60 Tab, Refills: 0    Associated Diagnoses: Cancer associated pain         CONTINUE these medications which have CHANGED    Details   benzonatate (TESSALON) 100 mg capsule Take 2 Caps by mouth three (3) times daily as needed for Cough.  Qty: 90 Cap, Refills: 0         CONTINUE these medications which have NOT CHANGED    Details   albuterol (PROVENTIL HFA, VENTOLIN HFA, PROAIR HFA) 90 mcg/actuation inhaler Take 2 Puffs by inhalation every four (4) hours as needed for Wheezing or Shortness of Breath. acetaminophen (TYLENOL) 500 mg tablet Take 1,000 mg by mouth every six (6) hours as needed for Pain. filgrastim-sndz (ZARXIO) 300 mcg/0.5 mL syrg injection 300 mcg by SubCUTAneous route five (5) days a week. Start the day after chemo, take for 5 days      ondansetron hcl (ZOFRAN) 8 mg tablet Take 8 mg by mouth every eight (8) hours as needed for Nausea. calcium carbonate-vitamin D3 1,000 mg(2,500 mg)-800 unit tab Take 1 Tab by mouth daily. traMADol (ULTRAM) 50 mg tablet Take 50 mg by mouth every eight (8) hours as needed for Pain (pain/cough). folic acid (FOLVITE) 1 mg tablet Take 1 mg by mouth daily. fluticasone-salmeterol (ADVAIR DISKUS) 250-50 mcg/dose diskus inhaler Take 1 Puff by inhalation two (2) times a day.      ezetimibe (ZETIA) 10 mg tablet Take 10 mg by mouth daily. fenofibrate (TRICOR) 160 mg tablet Take 160 mg by mouth daily. MULTIVITAMIN PO Take 1 Tab by mouth daily. albuterol (PROVENTIL VENTOLIN) 2.5 mg /3 mL (0.083 %) nebulizer solution 2.5 mg by Nebulization route three (3) times daily as needed for Wheezing or Shortness of Breath. Only for emergencies or if an infection sets in       docusate sodium (COLACE) 100 mg capsule Take 1 Cap by mouth two (2) times a day. May use OTC instead. Prevents constipation from narcotics.   Qty: 60 Cap, Refills: 0         STOP taking these medications       ALBUTEROL IN Comments:   Reason for Stopping:         chlorhexidine (PERIDEX) 0.12 % solution Comments:   Reason for Stopping:               My Recommended Diet, Activity, Wound Care, and follow-up labs are listed in the patient's Discharge Insturctions which I have personally completed and reviewed. ______________________________________________________________________    Risk of deterioration: Moderate    Condition at Discharge:  Stable  ______________________________________________________________________    Disposition  Home with family, no needs  ______________________________________________________________________    Care Plan discussed with:   Patient, Family, RN, Care Manager    ______________________________________________________________________      ______________________________________________________________________      Follow up with:   PCP : Maritza Sorto MD  Follow-up Information     Follow up With Details Comments Contact Info    Maritza Sorto MD   [address]  [address] [address] [phone]      Cami Orlando MD Schedule an appointment as soon as possible for a visit  [address] [address]  [phone]                Total time in minutes spent coordinating this discharge (includes going over instructions, follow-up, prescriptions, and preparing report for sign off to her PCP) :  35 minutes    Signed:  Ángela Londono MD

## 2018-01-04 NOTE — PROGRESS NOTES
Bedside and Verbal shift change report given to Sosa RN (oncoming nurse) by ODALIS Dominguez RN (offgoing nurse). Report given with SBAR, Kardex, Intake/Output, MAR and Recent Results.

## 2018-01-04 NOTE — PROGRESS NOTES
1/4/2018  12:05 PM  Home Oxygen Challenge    Sa02 93 % on room air AT REST. Sa02 88 % on room air DURING AMBULATION. Sa02 93 % on 1 Liters DURING AMBULATION. Sa02 94 % on 1 Liters AT REST/AFTER AMBULATION.     Chencho Patrick RN

## 2018-01-04 NOTE — DISCHARGE INSTRUCTIONS
HOSPITALIST DISCHARGE INSTRUCTIONS    NAME: Cricket Escamilla   :  1950   MRN:  [id number]     Date/Time:  2018 11:09 AM    ADMIT DATE: 2018     DISCHARGE DATE: 2018     DISCHARGE DIAGNOSIS:  Progressive SOB/CABRALES and orthopnea   Worsening Chest, back and shoulder pains  Persistent cough  Generalized weakness   Stage 4 Lung cancer (worsening mets on CT scan)  Brain metastasis, s/p recent gamma knife   Bone (rib/spine) metastasis   COPD    MEDICATIONS:  · It is important that you take the medication exactly as they are prescribed. · Keep your medication in the bottles provided by the pharmacist and keep a list of the medication names, dosages, and times to be taken in your wallet. · Do not take other medications without consulting your doctor. Pain Management: per above medications    What to do at Home    Recommended diet:  Resume previous diet    Recommended activity: Activity as tolerated    If you have questions regarding the hospital related prescriptions or hospital related issues please call [facility]il[facility] at [phone]. If you experience any of the following symptoms then please call your primary care physician or return to the emergency room if you cannot get hold of your doctor:  Fever, chills, nausea, vomiting, diarrhea, change in mentation, falling, bleeding, shortness of breath    Follow Up:  Dr. Lamon Dance., MD  you are to call and set up an appointment to see them in 7-10 days. Information obtained by :  I understand that if any problems occur once I am at home I am to contact my physician. I understand and acknowledge receipt of the instructions indicated above.                                                                                                                                            Physician's or R.N.'s Signature                                                                  Date/Time Patient or Representative Signature                                                          Date/Time

## 2018-01-04 NOTE — PROGRESS NOTES
Hematology Oncology Progress Note    Follow up for: Stage IV NSCLC    Chart notes reviewed since last visit. Case discussed with following:     Patient complains of the following: feels better, pain under good control    Additional concerns noted by the staff:     Patient Vitals for the past 24 hrs:   BP Temp Pulse Resp SpO2   01/04/18 0909 - - - - 94 %   01/04/18 0800 128/57 98.2 °F (36.8 °C) 99 18 94 %   01/03/18 2213 125/64 98.3 °F (36.8 °C) (!) 103 18 94 %   01/03/18 2148 - - - - 92 %   01/03/18 2035 126/40 98.5 °F (36.9 °C) 97 18 94 %   01/03/18 1616 140/55 97.7 °F (36.5 °C) 98 20 96 %   01/03/18 1420 - - - - 94 %       Review of Systems:  12 point ROS done and negative except as above    Physical Examination:  Gen NAD  CV reg  Lungs clear  abd benign    Labs:  No results found for this or any previous visit (from the past 24 hour(s)). Imaging:  Thoracic MRI  IMPRESSION:  1. Diffuse bony metastatic disease as detailed above.  Large metastatic composite  involves the right T1 and T2 posterior elements and may slightly encroach on the  posterior margin of the right T1-T2 foramen but there is no epidural spread of  Contrast.    Assessment and Plan:   Stage IV NSCLC  -Just started treatment under the care of my partner Dr. Carmelina Molina  -F/U in office next week    Bony Disease in spine  -No cord compression  -Pain under good control  -Will need to start [facility] as outpatient

## 2018-01-04 NOTE — PROGRESS NOTES
Problem: Falls - Risk of  Goal: *Absence of Falls  Document Jac Fall Risk and appropriate interventions in the flowsheet.    Outcome: Progressing Towards Goal  Fall Risk Interventions:            Medication Interventions: Teach patient to arise slowly

## 2018-01-04 NOTE — CONSULTS
Palliative Medicine Consult  [facility]: [phone])    Patient Name: Kathi Butcher  YOB: 1950    Date of Initial Consult: 1/3/17  Reason for Consult: overwhelming symptoms  Requesting Provider: Blanca Patel MD  Primary Care Physician: All Stubbs MD     SUMMARY:   Kathi Butcher is a 79 y.o. female with a past history of COPD, recent diagnosis of lung cancer with mets to bone and brain s/p gamma knife on 12/19 and first chemo on  12/28, who was admitted on 1/2/2018 from  Home with  Sob and cancer associated pain . Current medical issues leading to Palliative Medicine involvement include: overwhelming symptoms. Lives at home with her , independent , drives , has 4 childern. Retired from [employer]. CTA 1/2/17 :   Extensive worsening of metastatic disease. Significant worsening of  mediastinal lymph nodes, mass adjacent to the aortic arch which is continuous  with the known left upper lobe neoplasm, multiple bilateral pulmonary nodules,  probable lymphangitic spread of tumor, and metastatic lumbar, right rib and  right clavicular lesions. Associated narrowing of the left upper lobe pulmonary  artery. 3. Bilateral pleural effusions. 4. Possible pathologic fracture of T10. PALLIATIVE DIAGNOSES:   1. Sob   2. Cancer associated pain (well controlled)  3. Cough   4. Generalized weakness  5. Rapidly progressive metastatic lung cancer. PLAN:   Visited patient with her  . She is getting ready to be discharged. she reports flare up of pain in both shoulder now, wants to get percocet before going home. - overall pain controlled , required only one dose of percocet  Today at 0700.    - counseled on side effect of opioids. - encouraged to use pain medication before pain gets worse. Initial consult note routed to primary continuity provider  Communicated plan of care with: Palliative IDT.        GOALS OF CARE / TREATMENT PREFERENCES:     GOALS OF CARE:  Patient/Health Care Proxy Stated Goals: Prolong life      TREATMENT PREFERENCES:   Code Status: DNR    Advance Care Planning:  Advance Care Planning 1/2/2018   Patient's Healthcare Decision Maker is: Legal Next of Kin   Primary Decision Maker Name Philip Glez   Primary Decision Maker Phone Number -   Confirm Advance Directive Yes, not on file       Medical Interventions: Full interventions   Other Instructions: Other:    As far as possible, the palliative care team has discussed with patient / health care proxy about goals of care / treatment preferences for patient. HISTORY:     History obtained from: chart and patient  CHIEF COMPLAINT: admitted for sob and pain . HPI/SUBJECTIVE:    The patient is:   [] Verbal and participatory   progressive sob x couple of months with episode of cough and \" short winded \", sleeps in recliner at home. Bilateral shoulder pain x couple of months, takes tramadol and percocet at home , percocet helps. currently denies any pain . Sob improved with nebulizer and inhaler. Appetite is decreased , weight loss of 25 lbs in one month, metallic taste in mouth, takes glucerna and eat vegetables mostly . Per nurse patient had loose bowel movement today. Clinical Pain Assessment (nonverbal scale for severity on nonverbal patients):   Clinical Pain Assessment  Severity: 2  Location: both shoulders, upper back  Character: sharp  Duration: weeks  Effect: certain position can increase pain. Factors: percocet helps.   Frequency: constant          Duration: for how long has pt been experiencing pain (e.g., 2 days, 1 month, years)  Frequency: how often pain is an issue (e.g., several times per day, once every few days, constant)     FUNCTIONAL ASSESSMENT:     Palliative Performance Scale (PPS):  PPS: 50       PSYCHOSOCIAL/SPIRITUAL SCREENING:     Palliative IDT has assessed this patient for cultural preferences / practices and a referral made as appropriate to needs (Cultural Services, Patient Advocacy, Ethics, etc.)    Advance Care Planning:  Advance Care Planning 1/2/2018   Patient's Healthcare Decision Maker is: Legal Next of Nilay Ricardo   Primary Decision Maker Name Camila Prado   Primary Decision Maker Phone Number -   Confirm Advance Directive Yes, not on file       Any spiritual / Quaker concerns:  [] Yes /  [] No    Caregiver Burnout:  [] Yes /  [] No /  [] No Caregiver Present      Anticipatory grief assessment:   [] Normal  / [] Maladaptive       ESAS Anxiety: Anxiety: 2    ESAS Depression: Depression: 0        REVIEW OF SYSTEMS:     Positive and pertinent negative findings in ROS are noted above in HPI. The following systems were [x] reviewed / [] unable to be reviewed as noted in HPI  Other findings are noted below. Systems: constitutional, ears/nose/mouth/throat, respiratory, gastrointestinal, genitourinary, musculoskeletal, integumentary, neurologic, psychiatric, endocrine. Positive findings noted below. Modified ESAS Completed by: provider   Fatigue: 5 Drowsiness: 0   Depression: 0 Pain: 2   Anxiety: 2 Nausea: 0   Anorexia: 4 Dyspnea: 0     Constipation: No     Stool Occurrence(s): 1        PHYSICAL EXAM:     From RN flowsheet:  Wt Readings from Last 3 Encounters:   01/02/18 140 lb (63.5 kg)   01/03/18 140 lb (63.5 kg)   12/22/17 154 lb 1.6 oz (69.9 kg)     Blood pressure 128/57, pulse 99, temperature 98.2 °F (36.8 °C), resp. rate 18, height 5' 4.5\" (1.638 m), weight 140 lb (63.5 kg), SpO2 94 %. Pain Scale 1: Numeric (0 - 10)  Pain Intensity 1: 3     Pain Location 1: Back, Chest  Pain Orientation 1: Left, Right  Pain Description 1: Aching  Pain Intervention(s) 1: Emotional support  Last bowel movement, if known:     Constitutional: getting ready to be discharged home .   Cardiovascular: regular rhythm, distal pulses intact  Respiratory: breathing not labored, symmetric  Gastrointestinal: soft non-tender, +bowel sounds  Musculoskeletal: no deformity, no tenderness to palpation  Skin: warm, dry  Neurologic: following commands, moving all extremities  Psychiatric: full affect, no hallucinations         HISTORY:     Active Problems:    SOB (shortness of breath) (1/2/2018)      Intractable pain (1/2/2018)      Past Medical History:   Diagnosis Date    Arthritis     hands    Back pain     Cancer ([address] 11/06/2017    metastatic lung cancer    Chronic obstructive pulmonary disease (HCC)     Chronic pain     shoulder/back    Diverticulitis     Hypercholesterolemia     Hypertension     IBS (irritable bowel syndrome)     Menopause 1982    Muscle ache       Past Surgical History:   Procedure Laterality Date    CHEST SURGERY PROCEDURE UNLISTED      bronchoscopy    HX APPENDECTOMY      [address] [place]  [address]    umbilical    HX HYSTERECTOMY  1982    HX OOPHORECTOMY Left 1982    HX OOPHORECTOMY Right 1983    [facility]    D&C    [address] [place] [address] [place]    partial hysterectomy    HX OTHER SURGICAL  1982    hysterectomy    HX OTHER SURGICAL  1982    breast implants, [employer]    HX OTHER SURGICAL  2011    implant removal, [employer]    HX OTHER SURGICAL      colonoscopy    HX TONSILLECTOMY  1971    IMPLANT BREAST SILICONE/EQ Bilateral 6085    REMOVAL OF BREAST IMPLANT Bilateral 1989      Family History   Problem Relation Age of Onset    Family history unknown: Yes      History reviewed, no pertinent family history. Social History   Substance Use Topics    Smoking status: Former Smoker     Packs/day: 1.00     Years: 17.00     Quit date: 4/2/1986    Smokeless tobacco: Never Used    Alcohol use No     Allergies   Allergen Reactions    Latex, Natural Rubber Itching     Per patient she experienced itching after pulling off latex tape post surgery.      Demerol [Meperidine] Nausea and Vomiting    Doxycycline Other (comments)     Female infection    Erythromycin Other (comments)     Female infection    Hydrocodone Itching     Dizzy, Headache    Penicillins Other (comments)     From childhood    Percodan [Oxycodone Hcl-Oxycodone-Asa] Nausea and Vomiting     headache    Sulfa (Sulfonamide Antibiotics) Swelling    Tetracycline Other (comments)     Female infection    Xylocaine [Lidocaine Hcl] Nausea Only     headache      Current Facility-Administered Medications   Medication Dose Route Frequency    fluticasone-vilanterol (BREO ELLIPTA) 100mcg-25mcg/puff  1 Puff Inhalation DAILY    sodium chloride (NS) flush 5-10 mL  5-10 mL IntraVENous Q8H    sodium chloride (NS) flush 5-10 mL  5-10 mL IntraVENous PRN    acetaminophen (TYLENOL) tablet 650 mg  650 mg Oral Q4H PRN    oxyCODONE-acetaminophen (PERCOCET) 5-325 mg per tablet 1 Tab  1 Tab Oral Q4H PRN    naloxone (NARCAN) injection 0.4 mg  0.4 mg IntraVENous PRN    ondansetron (ZOFRAN) injection 4 mg  4 mg IntraVENous Q4H PRN    enoxaparin (LOVENOX) injection 40 mg  40 mg SubCUTAneous Q24H    diphenhydrAMINE (BENADRYL) injection 25 mg  25 mg IntraVENous Q6H PRN    albuterol-ipratropium (DUO-NEB) 2.5 MG-0.5 MG/3 ML  3 mL Nebulization Q6H RT    albuterol-ipratropium (DUO-NEB) 2.5 MG-0.5 MG/3 ML  3 mL Nebulization Q4H PRN    benzonatate (TESSALON) capsule 200 mg  200 mg Oral TID    HYDROmorphone (DILAUDID) injection 1 mg  1 mg IntraVENous Q4H PRN    guaiFENesin (ROBITUSSIN) 100 mg/5 mL oral liquid 200 mg  200 mg Oral Q4H PRN     Current Outpatient Prescriptions   Medication Sig    benzonatate (TESSALON) 100 mg capsule Take 2 Caps by mouth three (3) times daily as needed for Cough.  oxyCODONE-acetaminophen (PERCOCET) 5-325 mg per tablet Take 1 Tab by mouth every four (4) hours as needed. Max Daily Amount: 6 Tabs. Indications: Pain    albuterol (PROVENTIL HFA, VENTOLIN HFA, PROAIR HFA) 90 mcg/actuation inhaler Take 2 Puffs by inhalation every four (4) hours as needed for Wheezing or Shortness of Breath.     acetaminophen (TYLENOL) 500 mg tablet Take 1,000 mg by mouth every six (6) hours as needed for Pain.  filgrastim-sndz (ZARXIO) 300 mcg/0.5 mL syrg injection 300 mcg by SubCUTAneous route five (5) days a week. Start the day after chemo, take for 5 days    ondansetron hcl (ZOFRAN) 8 mg tablet Take 8 mg by mouth every eight (8) hours as needed for Nausea.  calcium carbonate-vitamin D3 1,000 mg(2,500 mg)-800 unit tab Take 1 Tab by mouth daily.  traMADol (ULTRAM) 50 mg tablet Take 50 mg by mouth every eight (8) hours as needed for Pain (pain/cough).  folic acid (FOLVITE) 1 mg tablet Take 1 mg by mouth daily.  fluticasone-salmeterol (ADVAIR DISKUS) 250-50 mcg/dose diskus inhaler Take 1 Puff by inhalation two (2) times a day.  ezetimibe (ZETIA) 10 mg tablet Take 10 mg by mouth daily.  fenofibrate (TRICOR) 160 mg tablet Take 160 mg by mouth daily.  MULTIVITAMIN PO Take 1 Tab by mouth daily.  albuterol (PROVENTIL VENTOLIN) 2.5 mg /3 mL (0.083 %) nebulizer solution 2.5 mg by Nebulization route three (3) times daily as needed for Wheezing or Shortness of Breath. Only for emergencies or if an infection sets in     docusate sodium (COLACE) 100 mg capsule Take 1 Cap by mouth two (2) times a day. May use OTC instead. Prevents constipation from narcotics. LAB AND IMAGING FINDINGS:     Lab Results   Component Value Date/Time    WBC 32.4 01/03/2018 04:08 AM    HGB 11.6 01/03/2018 04:08 AM    PLATELET 806 00/06/2413 04:08 AM     Lab Results   Component Value Date/Time    Sodium 137 01/03/2018 04:08 AM    Potassium 3.2 01/03/2018 04:08 AM    Chloride 101 01/03/2018 04:08 AM    CO2 27 01/03/2018 04:08 AM    BUN 7 01/03/2018 04:08 AM    Creatinine 0.36 01/03/2018 04:08 AM    Calcium 7.4 01/03/2018 04:08 AM    Magnesium 2.3 01/02/2018 10:17 AM      Lab Results   Component Value Date/Time    AST (SGOT) 37 01/02/2018 10:17 AM    Alk.  phosphatase 163 01/02/2018 10:17 AM    Protein, total 7.0 01/02/2018 10:17 AM    Albumin 3.0 01/02/2018 10:17 AM    Globulin 4.0 01/02/2018 10:17 AM     Lab Results   Component Value Date/Time    INR 1.1 01/02/2018 10:17 AM    Prothrombin time 10.8 01/02/2018 10:17 AM      No results found for: IRON, FE, TIBC, IBCT, PSAT, FERR   No results found for: PH, PCO2, PO2  No components found for: GLPOC   No results found for: CPK, CKMB             Total time: 15 mins  Counseling / coordination time, spent as noted above:   > 50% counseling / coordination?:     Prolonged service was provided for  []30 min   []75 min in face to face time in the presence of the patient, spent as noted above. Time Start:   Time End:   Note: this can only be billed with 96457 (initial) or 51508 (follow up). If multiple start / stop times, list each separately.

## 2018-01-04 NOTE — PROGRESS NOTES
I have reviewed discharge instructions with the patient and spouse. The patient and spouse verbalized understanding. Removed patyients IV, she tolerated well. Walked patient to her car.

## 2018-01-04 NOTE — PROGRESS NOTES
80 yo female w/ Hx of COPD and recent diagnosis of lung cancer with mets to bone and brain. Patient is s/p gamma knife on 12/19 and her first chemo session 12/28/17. Rainat was admitted to [facility] on 1/2/2018 with increasing SOB and CABRALES. Patient seen by oncology and palliative care for further disease assessment and pain control. Patient was independent with all ADL's prior to admission. States she has an adult son with special needs that she also cares for at home. Patient's O2 sat was assessed and found to drop to 88% with ambulation. Oxygen referral sent to [facility] and a portable tank provided to patient for transport home. [facility] to meet patient at the home for home set up of O2 for 2L NC continuous with portable tanks. Care Management Interventions  PCP Verified by CM: Yes Blanca Alston MD)  Mode of Transport at Discharge: Other (see comment) ( - private vehicle)  Transition of Care Consult (CM Consult): Discharge Planning  MyChart Signup: No  Discharge Durable Medical Equipment: Yes (Home O2 through [address])  Physical Therapy Consult: No  Occupational Therapy Consult: No  Speech Therapy Consult: No  Current Support Network: Lives with Spouse, Own Home  Confirm Follow Up Transport: Family  Plan discussed with Pt/Family/Caregiver: Yes  Freedom of Choice Offered:  ([address] limited by providers and insurance for DME)  Discharge Location  Discharge Placement: Home with family assistance     Patient plans to see Dr. Kae Art in follow-up and  will make appointment for next week. Patient will also plan to see her PCP and  will also make that appointment.     Bia Sidhu RN, BSN, ACM   - Medical Oncology  [phone]

## 2018-01-04 NOTE — PROGRESS NOTES
Oncology Nursing Communication Tool  7:16 PM  1/3/2018     Bedside and Verbal shift change report given to Chino Doty RN (incoming nurse) by Robel Ramirez (outgoing nurse) on Adolm Needle. Report included the following information SBAR, Kardex, Procedure Summary, Intake/Output, MAR, Accordion and Recent Results. Shift Summary:Patient is currently in MRI. Patient had consults from oncology and pallative care today. Issues for physician to address: none       Oncology Shift Note   Admission Date 1/2/2018   Admission Diagnosis SOB (shortness of breath)  Intractable pain   Code Status DNR   Consults IP CONSULT TO ONCOLOGY  IP CONSULT TO PALLIATIVE CARE - PROVIDER      Cardiac Monitoring [] Yes [x] No      Purposeful Hourly Rounding [x] Yes    Jac Score Total Score: 1   Jac score 3 or > [] Bed Alarm [] Avasys [] 1:1 sitter [] Patient refused (Place signed refusal form in chart)      Pain Managed [x] Yes [] No    Key Pain Meds             acetaminophen (TYLENOL) 500 mg tablet  (Taking) Take 1,000 mg by mouth every six (6) hours as needed for Pain.    traMADol (ULTRAM) 50 mg tablet  (Taking) Take 50 mg by mouth every eight (8) hours as needed for Pain (pain/cough). Influenza Vaccine Received Flu Vaccine for Current Season (usually Sept-March): Yes           Oxygen needs?  [x] Room air Oxygen @  []1L    []2L    []3L   []4L    []5L   []6L     Use home O2? [] Yes [] No  Perform O2 challenge test using  smartphrase (.oxygenchallenge)      Last bowel movement Last Bowel Movement Date: 01/02/18  bowel movement      Urinary Catheter             LDAs               Peripheral IV 01/02/18 Right Forearm (Active)   Site Assessment Clean, dry, & intact 1/3/2018  3:27 PM   Phlebitis Assessment 0 1/3/2018  3:27 PM   Infiltration Assessment 0 1/3/2018  3:27 PM   Dressing Status Clean, dry, & intact 1/3/2018  3:27 PM   Dressing Type Tape;Transparent 1/3/2018  3:27 PM   Hub Color/Line Status Pink;Capped 1/3/2018  3:27 PM   Action Taken Blood drawn 1/2/2018 10:25 AM                         Readmission Risk Assessment Tool Score Low Risk            7       Total Score        2 . Living with Significant Other. Assisted Living. LTAC. SNF. or   Rehab    5 Pt.  Coverage (Medicare=5 , Medicaid, or Self-Pay=4)        Criteria that do not apply:    Has Seen PCP in Last 6 Months (Yes=3, No=0)    Patient Length of Stay (>5 days = 3)    IP Visits Last 12 Months (1-3=4, 4=9, >4=11)    Charlson Comorbidity Score (Age + Comorbid Conditions)       Expected Length of Stay 3d 12h   Actual Length of Stay [address]

## 2018-01-04 NOTE — PROGRESS NOTES
Oncology Interdisciplinary rounds were held today to discuss patient plan of care and outcomes. The following members were present: Nursing and Case Management.     ALOS: 2  DRG GLOS: 3.5    Plan of Care Discharge Disposition   Pain management  Palliative/Oncology consulted Home 24 hours

## 2018-01-11 ENCOUNTER — OFFICE VISIT (OUTPATIENT)
Dept: SURGERY | Age: 68
End: 2018-01-11

## 2018-01-11 VITALS
WEIGHT: 138.6 LBS | BODY MASS INDEX: 23.66 KG/M2 | HEIGHT: 64 IN | HEART RATE: 97 BPM | RESPIRATION RATE: 18 BRPM | OXYGEN SATURATION: 94 % | DIASTOLIC BLOOD PRESSURE: 80 MMHG | SYSTOLIC BLOOD PRESSURE: 138 MMHG

## 2018-01-11 DIAGNOSIS — C34.90 MALIGNANT NEOPLASM OF LUNG, UNSPECIFIED LATERALITY, UNSPECIFIED PART OF LUNG (HCC): Primary | ICD-10-CM

## 2018-01-11 NOTE — MR AVS SNAPSHOT
Visit Information Date & Time Provider Department Dept. Phone Encounter #  
 1/11/2018 10:00 AM Edi Art MD [facility] [address] [id number] Upcoming Health Maintenance Date Due Hepatitis C Screening 1950 DTaP/Tdap/Td series (1 - Tdap) 10/15/1971 FOBT Q 1 YEAR AGE 50-75 10/15/2000 ZOSTER VACCINE AGE 60> 8/15/2010 GLAUCOMA SCREENING Q2Y 10/15/2015 OSTEOPOROSIS SCREENING (DEXA) 10/15/2015 Pneumococcal 65+ High/Highest Risk (1 of 2 - PCV13) 10/15/2015 MEDICARE YEARLY EXAM 10/15/2015 Influenza Age 5 to Adult 8/1/2017 BREAST CANCER SCRN MAMMOGRAM 5/24/2019 Allergies as of 1/11/2018  Review Complete On: 1/11/2018 By: Edi Art MD  
  
 Severity Noted Reaction Type Reactions Latex, Natural Rubber  01/03/2018    Itching Per patient she experienced itching after pulling off latex tape post surgery. Demerol [Meperidine]  04/02/2014    Nausea and Vomiting Doxycycline  04/02/2014    Other (comments) Female infection Erythromycin  04/02/2014    Other (comments) Female infection Hydrocodone  08/13/2014   Systemic Itching Dizzy, Headache Penicillins  04/02/2014    Other (comments) From childhood Percodan [Oxycodone Hcl-oxycodone-asa]  04/02/2014    Nausea and Vomiting  
 headache  
 Sulfa (Sulfonamide Antibiotics)  04/02/2014    Swelling Tetracycline  04/02/2014    Other (comments) Female infection Xylocaine [Lidocaine Hcl]  04/02/2014    Nausea Only  
 headache Current Immunizations  Never Reviewed No immunizations on file. Not reviewed this visit You Were Diagnosed With   
  
 Codes Comments Malignant neoplasm of lung, unspecified laterality, unspecified part of lung ([facility] [address]    -  Primary ICD-10-CM: C34.90 ICD-9-CM: 162.9 Vitals BP Pulse Resp Height(growth percentile) Weight(growth percentile) SpO2 138/80 (BP 1 Location: Left arm, BP Patient Position: Sitting) 97 18 5' 4\" (1.626 m) 138 lb 9.6 oz (62.9 kg) 94% BMI OB Status Smoking Status 23.79 kg/m2 Hysterectomy Former Smoker BMI and BSA Data Body Mass Index Body Surface Area  
 23.79 kg/m 2 1.69 m 2 Preferred Pharmacy Pharmacy Name Phone [id number] [address] [phone] Your Updated Medication List  
  
   
This list is accurate as of: 1/11/18 11:11 AM.  Always use your most recent med list.  
  
  
  
  
 acetaminophen 500 mg tablet Commonly known as:  TYLENOL Take 1,000 mg by mouth every six (6) hours as needed for Pain. ADVAIR DISKUS 250-50 mcg/dose diskus inhaler Generic drug:  fluticasone-salmeterol Take 1 Puff by inhalation two (2) times a day. * albuterol 2.5 mg /3 mL (0.083 %) nebulizer solution Commonly known as:  PROVENTIL VENTOLIN  
2.5 mg by Nebulization route three (3) times daily as needed for Wheezing or Shortness of Breath. Only for emergencies or if an infection sets in  
  
 * albuterol 90 mcg/actuation inhaler Commonly known as:  PROVENTIL HFA, VENTOLIN HFA, PROAIR HFA Take 2 Puffs by inhalation every four (4) hours as needed for Wheezing or Shortness of Breath. benzonatate 100 mg capsule Commonly known as:  TESSALON Take 2 Caps by mouth three (3) times daily as needed for Cough. calcium carbonate-vitamin D3 1,000 mg(2,500 mg)-800 unit Tab Take 1 Tab by mouth daily. docusate sodium 100 mg capsule Commonly known as:  Jesús Lv Take 1 Cap by mouth two (2) times a day. May use OTC instead. Prevents constipation from narcotics. fenofibrate 160 mg tablet Commonly known as:  LOFIBRA Take 160 mg by mouth daily. folic acid 1 mg tablet Commonly known as:  Google Take 1 mg by mouth daily. MULTIVITAMIN PO Take 1 Tab by mouth daily. ondansetron hcl 8 mg tablet Commonly known as:  Clide Sharp Take 8 mg by mouth every eight (8) hours as needed for Nausea. oxyCODONE-acetaminophen 5-325 mg per tablet Commonly known as:  PERCOCET Take 1 Tab by mouth every four (4) hours as needed. Max Daily Amount: 6 Tabs. Indications: Pain  
  
 traMADol 50 mg tablet Commonly known as:  ULTRAM  
Take 50 mg by mouth every eight (8) hours as needed for Pain (pain/cough). ZARXIO 300 mcg/0.5 mL Syrg injection Generic drug:  filgrastim-sndz  
300 mcg by SubCUTAneous route five (5) days a week. Start the day after chemo, take for 5 days ZETIA 10 mg tablet Generic drug:  ezetimibe Take 10 mg by mouth daily. * Notice: This list has 2 medication(s) that are the same as other medications prescribed for you. Read the directions carefully, and ask your doctor or other care provider to review them with you. To-Do List   
 02/15/2018 12:15 PM  
  Appointment with [facility] MRI 2 at [facility] ([phone]) 1. Please bring a list or a bag of your current medications to your appointment 2. Please be sure to remove ALL hair clips, pins, extensions, etc., prior to arriving for your MRI procedure. 3. If you have any medical implants or devices, please bring associated medical card with you. 4. Bring any non [facility] films or CDs pertaining to the area being imaged with you on the day of appointment. 5. A written order with a valid diagnosis and Physicians  signature is required for all scheduled tests. 6. Check in at registration 30min before your appointment time unless you were instructed to do otherwise. Introducing [facility]! Dear Farhat Graham: Thank you for requesting a [employer] account. Our records indicate that you already have an active [employer] account. You can access your account anytime at [email or web address] Did you know that you can access your hospital and ER discharge instructions at any time in [employer]? You can also review all of your test results from your hospital stay or ER visit. Additional Information If you have questions, please visit the Frequently Asked Questions section of the [employer] website at [email or web address] Remember, [employer] is NOT to be used for urgent needs. For medical emergencies, dial 911. Now available from your iPhone and Android! Please provide this summary of care documentation to your next provider. Your primary care clinician is listed as Keri Frank. If you have any questions after today's visit, please call [phone].

## 2018-01-11 NOTE — PROGRESS NOTES
To: Jackie Dinh MD  CC: Amee Stack MD, Camille Samayoa MD  From: Jarred Kamara MD    Thank you for referring [facility]. Encounter Date: 1/11/2018    Subjective:      Karime Jaffe is a 79 y.o. female presents for postop care. The patient has no complaints except decreased energy. She had been hospitalized for the same and is using oxygen at times - not today. She is now seeing Dr. Bryan Schultz and is very happy with his care. Has second chemo infusion today. Objective:     General:  alert, cooperative, no distress, appears stated age   Incision:  Well healed. Port easily palpable. No arm edema. Assessment:     S/p right chest mediport. Chemo for lung cancer ongoing. Doing well postoperatively. Plan:     Follow-up only as needed. Knows to call for any concerns.       Jarred Kamara MD

## 2018-02-15 ENCOUNTER — HOSPITAL ENCOUNTER (OUTPATIENT)
Dept: MRI IMAGING | Age: 68
Discharge: HOME | End: 2018-02-15
Attending: NEUROLOGICAL SURGERY
Payer: MEDICARE

## 2018-02-15 VITALS — WEIGHT: 142 LBS | BODY MASS INDEX: 24.24 KG/M2 | HEIGHT: 64 IN

## 2018-02-15 DIAGNOSIS — C79.31 BRAIN METASTASES (HCC): ICD-10-CM

## 2018-02-15 PROCEDURE — 74011250636 HC RX REV CODE- 250/636: Performed by: NEUROLOGICAL SURGERY

## 2018-02-15 PROCEDURE — 70553 MRI BRAIN STEM W/O & W/DYE: CPT

## 2018-02-15 PROCEDURE — A9576 INJ PROHANCE MULTIPACK: HCPCS | Performed by: NEUROLOGICAL SURGERY

## 2018-02-15 RX ADMIN — GADOTERIDOL 13 ML: 279.3 INJECTION, SOLUTION INTRAVENOUS at 12:41

## 2018-03-06 ENCOUNTER — APPOINTMENT (OUTPATIENT)
Dept: GENERAL RADIOLOGY | Age: 68
End: 2018-03-06
Attending: EMERGENCY MEDICINE
Payer: MEDICARE

## 2018-03-06 ENCOUNTER — HOSPITAL ENCOUNTER (EMERGENCY)
Age: 68
Discharge: HOME | End: 2018-03-06
Attending: EMERGENCY MEDICINE
Payer: MEDICARE

## 2018-03-06 VITALS
OXYGEN SATURATION: 100 % | TEMPERATURE: 98.7 F | HEIGHT: 64 IN | DIASTOLIC BLOOD PRESSURE: 64 MMHG | HEART RATE: 106 BPM | SYSTOLIC BLOOD PRESSURE: 144 MMHG | RESPIRATION RATE: 30 BRPM | WEIGHT: 120 LBS | BODY MASS INDEX: 20.49 KG/M2

## 2018-03-06 DIAGNOSIS — R06.00 DYSPNEA, UNSPECIFIED TYPE: Primary | ICD-10-CM

## 2018-03-06 DIAGNOSIS — J81.0 ACUTE PULMONARY EDEMA (HCC): ICD-10-CM

## 2018-03-06 LAB
ALBUMIN SERPL-MCNC: 2.5 G/DL (ref 3.5–5)
ALBUMIN/GLOB SERPL: 0.6 {RATIO} (ref 1.1–2.2)
ALP SERPL-CCNC: 122 U/L (ref 45–117)
ALT SERPL-CCNC: 25 U/L (ref 12–78)
ANION GAP SERPL CALC-SCNC: 7 MMOL/L (ref 5–15)
APPEARANCE UR: ABNORMAL
AST SERPL-CCNC: 42 U/L (ref 15–37)
BACTERIA URNS QL MICRO: ABNORMAL /HPF
BASOPHILS # BLD: 0 K/UL (ref 0–0.1)
BASOPHILS NFR BLD: 0 % (ref 0–1)
BILIRUB SERPL-MCNC: 0.4 MG/DL (ref 0.2–1)
BILIRUB UR QL: NEGATIVE
BNP SERPL-MCNC: 2050 PG/ML (ref 0–125)
BUN SERPL-MCNC: 15 MG/DL (ref 6–20)
BUN/CREAT SERPL: 26 (ref 12–20)
CALCIUM SERPL-MCNC: 8.8 MG/DL (ref 8.5–10.1)
CHLORIDE SERPL-SCNC: 100 MMOL/L (ref 97–108)
CK MB CFR SERPL CALC: 3.1 % (ref 0–2.5)
CK MB SERPL-MCNC: 1.5 NG/ML (ref 5–25)
CK SERPL-CCNC: 49 U/L (ref 26–192)
CO2 SERPL-SCNC: 31 MMOL/L (ref 21–32)
COLOR UR: ABNORMAL
CREAT SERPL-MCNC: 0.57 MG/DL (ref 0.55–1.02)
DIFFERENTIAL METHOD BLD: ABNORMAL
EOSINOPHIL # BLD: 0.1 K/UL (ref 0–0.4)
EOSINOPHIL NFR BLD: 2 % (ref 0–7)
EPITH CASTS URNS QL MICRO: ABNORMAL /LPF
ERYTHROCYTE [DISTWIDTH] IN BLOOD BY AUTOMATED COUNT: 18.9 % (ref 11.5–14.5)
GLOBULIN SER CALC-MCNC: 4 G/DL (ref 2–4)
GLUCOSE SERPL-MCNC: 136 MG/DL (ref 65–100)
GLUCOSE UR STRIP.AUTO-MCNC: NEGATIVE MG/DL
HCT VFR BLD AUTO: 33.9 % (ref 35–47)
HGB BLD-MCNC: 10.9 G/DL (ref 11.5–16)
HGB UR QL STRIP: ABNORMAL
IMM GRANULOCYTES # BLD: 0 K/UL (ref 0–0.04)
IMM GRANULOCYTES NFR BLD AUTO: 0 % (ref 0–0.5)
KETONES UR QL STRIP.AUTO: NEGATIVE MG/DL
LACTATE BLD-SCNC: 1.8 MMOL/L (ref 0.4–2)
LACTATE SERPL-SCNC: 2.4 MMOL/L (ref 0.4–2)
LEUKOCYTE ESTERASE UR QL STRIP.AUTO: ABNORMAL
LYMPHOCYTES # BLD: 2.1 K/UL (ref 0.8–3.5)
LYMPHOCYTES NFR BLD: 32 % (ref 12–49)
MCH RBC QN AUTO: 29.9 PG (ref 26–34)
MCHC RBC AUTO-ENTMCNC: 32.2 G/DL (ref 30–36.5)
MCV RBC AUTO: 92.9 FL (ref 80–99)
MONOCYTES # BLD: 0.2 K/UL (ref 0–1)
MONOCYTES NFR BLD: 3 % (ref 5–13)
MUCOUS THREADS URNS QL MICRO: ABNORMAL /LPF
NEUTS SEG # BLD: 4.1 K/UL (ref 1.8–8)
NEUTS SEG NFR BLD: 63 % (ref 32–75)
NITRITE UR QL STRIP.AUTO: NEGATIVE
NRBC # BLD: 0 K/UL (ref 0–0.01)
NRBC BLD-RTO: 0 PER 100 WBC
PH UR STRIP: 6 [PH] (ref 5–8)
PLATELET # BLD AUTO: 142 K/UL (ref 150–400)
PMV BLD AUTO: 10.4 FL (ref 8.9–12.9)
POTASSIUM SERPL-SCNC: 3.9 MMOL/L (ref 3.5–5.1)
PROT SERPL-MCNC: 6.5 G/DL (ref 6.4–8.2)
PROT UR STRIP-MCNC: ABNORMAL MG/DL
RBC # BLD AUTO: 3.65 M/UL (ref 3.8–5.2)
RBC #/AREA URNS HPF: ABNORMAL /HPF (ref 0–5)
RBC MORPH BLD: ABNORMAL
SODIUM SERPL-SCNC: 138 MMOL/L (ref 136–145)
SP GR UR REFRACTOMETRY: 1.02 (ref 1–1.03)
TROPONIN I SERPL-MCNC: <0.04 NG/ML
UA: UC IF INDICATED,UAUC: ABNORMAL
UROBILINOGEN UR QL STRIP.AUTO: 0.2 EU/DL (ref 0.2–1)
WBC # BLD AUTO: 6.5 K/UL (ref 3.6–11)
WBC MORPH BLD: ABNORMAL
WBC URNS QL MICRO: ABNORMAL /HPF (ref 0–4)

## 2018-03-06 PROCEDURE — 82550 ASSAY OF CK (CPK): CPT | Performed by: EMERGENCY MEDICINE

## 2018-03-06 PROCEDURE — 93005 ELECTROCARDIOGRAM TRACING: CPT

## 2018-03-06 PROCEDURE — 84484 ASSAY OF TROPONIN QUANT: CPT | Performed by: EMERGENCY MEDICINE

## 2018-03-06 PROCEDURE — 87040 BLOOD CULTURE FOR BACTERIA: CPT | Performed by: EMERGENCY MEDICINE

## 2018-03-06 PROCEDURE — 85025 COMPLETE CBC W/AUTO DIFF WBC: CPT | Performed by: EMERGENCY MEDICINE

## 2018-03-06 PROCEDURE — 83605 ASSAY OF LACTIC ACID: CPT | Performed by: EMERGENCY MEDICINE

## 2018-03-06 PROCEDURE — 71046 X-RAY EXAM CHEST 2 VIEWS: CPT

## 2018-03-06 PROCEDURE — 96361 HYDRATE IV INFUSION ADD-ON: CPT

## 2018-03-06 PROCEDURE — 96374 THER/PROPH/DIAG INJ IV PUSH: CPT

## 2018-03-06 PROCEDURE — 83880 ASSAY OF NATRIURETIC PEPTIDE: CPT | Performed by: EMERGENCY MEDICINE

## 2018-03-06 PROCEDURE — 36415 COLL VENOUS BLD VENIPUNCTURE: CPT | Performed by: EMERGENCY MEDICINE

## 2018-03-06 PROCEDURE — 87086 URINE CULTURE/COLONY COUNT: CPT | Performed by: EMERGENCY MEDICINE

## 2018-03-06 PROCEDURE — 83605 ASSAY OF LACTIC ACID: CPT

## 2018-03-06 PROCEDURE — 81001 URINALYSIS AUTO W/SCOPE: CPT | Performed by: EMERGENCY MEDICINE

## 2018-03-06 PROCEDURE — 99285 EMERGENCY DEPT VISIT HI MDM: CPT

## 2018-03-06 PROCEDURE — 74011250636 HC RX REV CODE- 250/636: Performed by: EMERGENCY MEDICINE

## 2018-03-06 PROCEDURE — 80053 COMPREHEN METABOLIC PANEL: CPT | Performed by: EMERGENCY MEDICINE

## 2018-03-06 RX ORDER — FUROSEMIDE 20 MG/1
10 TABLET ORAL DAILY
Qty: 3 TAB | Refills: 0 | Status: SHIPPED | OUTPATIENT
Start: 2018-03-06

## 2018-03-06 RX ORDER — FUROSEMIDE 10 MG/ML
40 INJECTION INTRAMUSCULAR; INTRAVENOUS
Status: COMPLETED | OUTPATIENT
Start: 2018-03-06 | End: 2018-03-06

## 2018-03-06 RX ADMIN — FUROSEMIDE 40 MG: 10 INJECTION, SOLUTION INTRAMUSCULAR; INTRAVENOUS at 18:23

## 2018-03-06 RX ADMIN — SODIUM CHLORIDE 500 ML: 900 INJECTION, SOLUTION INTRAVENOUS at 17:31

## 2018-03-06 NOTE — ED NOTES
Pt with hx ofl blessing cancer complains of sob x yesterday. Pt is on 2L chronic nasal O2 and has not turned it up. Pt also complains of increased viscosity of her secretions and she was not able to clear her airways. Pt denies chest pain.

## 2018-03-06 NOTE — ED TRIAGE NOTES
[facility] called in triage. Pt placed in hallway outside of triage for direct visualization by RNs.

## 2018-03-06 NOTE — ED TRIAGE NOTES
Pt requesting to leave, stating \"I feel better. \" Notified Dr. Marius Crigler, and she is currently speaking with pt and pt's  outside of triage.

## 2018-03-06 NOTE — ED NOTES
Pt has a pressure ulcer to the sacrum. Pt is placed on right side with a pillow to cusion to take pressure off of sacrum.

## 2018-03-06 NOTE — ED PROVIDER NOTES
EMERGENCY DEPARTMENT HISTORY AND PHYSICAL EXAM      Date: 3/6/2018  Patient Name: Heidi Britt    History of Presenting Illness     Chief Complaint   Patient presents with    Shortness of Breath     arrives via EMS, pt complains of SOB x1.5 hours PTA; hx stage IV lung cancer and COPD; pt's last chemo on Thursday; pt and EMS have administered nebs PTA with minimal relief; pt tachycardiac and tachypneic; pt using PRN oxygen more than usual; +accessory muscle use    Cough     thick productive cough       History Provided By: Patient and Patient's     HPI: Heidi Britt, 79 y.o. female with PMHx significant for lung CA, COPD, HTN, presents via EMS to the ED with cc of SOB earlier with no associated symptoms. Pt is currently on chemotherapy for lung CA. She noted she was SOB earlier and had started panicking about that, causing her to be more SOB. She was given a breathing tx by EMS and upon arrival the pt was tachypnic and tachycardic. Code purple was called during triage. After the breathing tx, she states she had felt better and now she feels fine and would like to leave. Pt is on O2 as needed at home, but for the past day and a half she has been using O2 all the time.  states this is baseline for the pt to become hypoxic and dyspneic upon exertion. Pt had a CT scan 2 weeks ago of her chest and her chemotherapy is working. Pt denies any productive cough or fever. PCP: Sue Centeno MD    There are no other complaints, changes, or physical findings at this time. Current Outpatient Prescriptions   Medication Sig Dispense Refill    furosemide (LASIX) 20 mg tablet Take 0.5 Tabs by mouth daily. 3 Tab 0    albuterol (PROVENTIL HFA, VENTOLIN HFA, PROAIR HFA) 90 mcg/actuation inhaler Take 2 Puffs by inhalation every four (4) hours as needed for Wheezing or Shortness of Breath.  acetaminophen (TYLENOL) 500 mg tablet Take 1,000 mg by mouth every six (6) hours as needed for Pain.       filgrastim-sndz (ZARXIO) 300 mcg/0.5 mL syrg injection 300 mcg by SubCUTAneous route five (5) days a week. Start the day after chemo, take for 5 days      ondansetron hcl (ZOFRAN) 8 mg tablet Take 8 mg by mouth every eight (8) hours as needed for Nausea.  folic acid (FOLVITE) 1 mg tablet Take 1 mg by mouth daily.  fluticasone-salmeterol (ADVAIR DISKUS) 250-50 mcg/dose diskus inhaler Take 1 Puff by inhalation two (2) times a day.  ezetimibe (ZETIA) 10 mg tablet Take 10 mg by mouth daily.  fenofibrate (TRICOR) 160 mg tablet Take 160 mg by mouth daily.  MULTIVITAMIN PO Take 1 Tab by mouth daily.  albuterol (PROVENTIL VENTOLIN) 2.5 mg /3 mL (0.083 %) nebulizer solution 2.5 mg by Nebulization route three (3) times daily as needed for Wheezing or Shortness of Breath.  Only for emergencies or if an infection sets in          Past History     Past Medical History:  Past Medical History:   Diagnosis Date    Arthritis     hands    Back pain     Cancer ([facility] [address] 11/06/2017    metastatic lung cancer    Chronic obstructive pulmonary disease (HCC)     Chronic pain     shoulder/back    Diverticulitis     Hypercholesterolemia     Hypertension     IBS (irritable bowel syndrome)     Menopause 1982    Muscle ache        Past Surgical History:  Past Surgical History:   Procedure Laterality Date    CHEST SURGERY PROCEDURE UNLISTED      bronchoscopy    HX APPENDECTOMY      [id number] [facility] [address]  1991    umbilical    HX HYSTERECTOMY  1982    HX OOPHORECTOMY Left 1982    HX OOPHORECTOMY Right 1983    [facility]    D&C    [address] [place] [address] [place]    partial hysterectomy    HX OTHER SURGICAL  1982    hysterectomy    HX OTHER SURGICAL  1982    breast implants, [employer]    HX OTHER SURGICAL  2011    implant removal, [employer]    HX OTHER SURGICAL      colonoscopy    HX TONSILLECTOMY  1971    IMPLANT BREAST SILICONE/EQ Bilateral 1684    REMOVAL OF BREAST IMPLANT Bilateral 1989       Family History:  Family History   Problem Relation Age of Onset    Family history unknown: Yes       Social History:  Social History   Substance Use Topics    Smoking status: Former Smoker     Packs/day: 1.00     Years: 17.00     Quit date: 4/2/1986    Smokeless tobacco: Never Used    Alcohol use No       Allergies: Allergies   Allergen Reactions    Latex, Natural Rubber Itching     Per patient she experienced itching after pulling off latex tape post surgery.  Demerol [Meperidine] Nausea and Vomiting    Doxycycline Other (comments)     Female infection    Erythromycin Other (comments)     Female infection    Hydrocodone Itching     Dizzy, Headache    Penicillins Other (comments)     From childhood    Percodan [Oxycodone Hcl-Oxycodone-Asa] Nausea and Vomiting     headache    Sulfa (Sulfonamide Antibiotics) Swelling    Tetracycline Other (comments)     Female infection    Xylocaine [Lidocaine Hcl] Nausea Only     headache         Review of Systems   Review of Systems   Constitutional: Negative for fatigue and fever. HENT: Negative. Eyes: Negative. Respiratory: Positive for shortness of breath. Negative for cough and wheezing. Cardiovascular: Negative for chest pain and leg swelling. Gastrointestinal: Negative for blood in stool, constipation, diarrhea, nausea and vomiting. Endocrine: Negative. Genitourinary: Negative for difficulty urinating and dysuria. Musculoskeletal: Negative. Skin: Negative for rash. Allergic/Immunologic: Negative. Neurological: Negative for weakness and numbness. Hematological: Negative. Psychiatric/Behavioral: Negative. Physical Exam   Physical Exam   Constitutional: She is oriented to person, place, and time. She appears well-developed and well-nourished. No distress. HENT:   Head: Normocephalic and atraumatic.    Mouth/Throat: Oropharynx is clear and moist. Eyes: Conjunctivae and EOM are normal.   Neck: Neck supple. No JVD present. No tracheal deviation present. Cardiovascular: Normal rate, regular rhythm and intact distal pulses. Exam reveals no gallop and no friction rub. No murmur heard. Pulmonary/Chest: Effort normal and breath sounds normal. No stridor. No respiratory distress. She has no wheezes. Speaking in full sentences, on 2L   Abdominal: Soft. Bowel sounds are normal. She exhibits no distension and no mass. There is no tenderness. There is no guarding. Musculoskeletal: Normal range of motion. She exhibits no edema or tenderness. No deformity, no peripheral edema   Neurological: She is alert and oriented to person, place, and time. She has normal strength. No focal deficits   Skin: Skin is warm, dry and intact. No rash noted. Psychiatric: She has a normal mood and affect. Her behavior is normal. Judgment and thought content normal.   Nursing note and vitals reviewed.       Diagnostic Study Results     Labs -  Recent Results (from the past 12 hour(s))   EKG, 12 LEAD, INITIAL    Collection Time: 03/06/18  3:30 PM   Result Value Ref Range    Ventricular Rate 103 BPM    Atrial Rate 103 BPM    P-R Interval 128 ms    QRS Duration 108 ms    Q-T Interval 342 ms    QTC Calculation (Bezet) 448 ms    Calculated P Axis 75 degrees    Calculated R Axis 56 degrees    Calculated T Axis 74 degrees    Diagnosis       Sinus tachycardia with frequent premature ventricular complexes  Cannot rule out Inferior infarct (cited on or before 20-DEC-2017)  Abnormal ECG  When compared with ECG of 02-JAN-2018 09:34,  premature ventricular complexes are now present  Nonspecific T wave abnormality no longer evident in Lateral leads     CBC WITH AUTOMATED DIFF    Collection Time: 03/06/18  3:43 PM   Result Value Ref Range    WBC 6.5 3.6 - 11.0 K/uL    RBC 3.65 (L) 3.80 - 5.20 M/uL    HGB 10.9 (L) 11.5 - 16.0 g/dL    HCT 33.9 (L) 35.0 - 47.0 %    MCV 92.9 80.0 - 99.0 FL MCH 29.9 26.0 - 34.0 PG    MCHC 32.2 30.0 - 36.5 g/dL    RDW 18.9 (H) 11.5 - 14.5 %    PLATELET 222 (L) 725 - 400 K/uL    MPV 10.4 8.9 - 12.9 FL    NRBC 0.0 0  WBC    ABSOLUTE NRBC 0.00 0.00 - 0.01 K/uL    NEUTROPHILS 63 32 - 75 %    LYMPHOCYTES 32 12 - 49 %    MONOCYTES 3 (L) 5 - 13 %    EOSINOPHILS 2 0 - 7 %    BASOPHILS 0 0 - 1 %    IMMATURE GRANULOCYTES 0 0.0 - 0.5 %    ABS. NEUTROPHILS 4.1 1.8 - 8.0 K/UL    ABS. LYMPHOCYTES 2.1 0.8 - 3.5 K/UL    ABS. MONOCYTES 0.2 0.0 - 1.0 K/UL    ABS. EOSINOPHILS 0.1 0.0 - 0.4 K/UL    ABS. BASOPHILS 0.0 0.0 - 0.1 K/UL    ABS. IMM. GRANS. 0.0 0.00 - 0.04 K/UL    DF AUTOMATED      RBC COMMENTS ANISOCYTOSIS  1+        WBC COMMENTS TOXIC GRANULATION     METABOLIC PANEL, COMPREHENSIVE    Collection Time: 03/06/18  3:43 PM   Result Value Ref Range    Sodium 138 136 - 145 mmol/L    Potassium 3.9 3.5 - 5.1 mmol/L    Chloride 100 97 - 108 mmol/L    CO2 31 21 - 32 mmol/L    Anion gap 7 5 - 15 mmol/L    Glucose 136 (H) 65 - 100 mg/dL    BUN 15 6 - 20 MG/DL    Creatinine 0.57 0.55 - 1.02 MG/DL    BUN/Creatinine ratio 26 (H) 12 - 20      GFR est AA >60 >60 ml/min/1.73m2    GFR est non-AA >60 >60 ml/min/1.73m2    Calcium 8.8 8.5 - 10.1 MG/DL    Bilirubin, total 0.4 0.2 - 1.0 MG/DL    ALT (SGPT) 25 12 - 78 U/L    AST (SGOT) 42 (H) 15 - 37 U/L    Alk.  phosphatase 122 (H) 45 - 117 U/L    Protein, total 6.5 6.4 - 8.2 g/dL    Albumin 2.5 (L) 3.5 - 5.0 g/dL    Globulin 4.0 2.0 - 4.0 g/dL    A-G Ratio 0.6 (L) 1.1 - 2.2     LACTIC ACID    Collection Time: 03/06/18  3:43 PM   Result Value Ref Range    Lactic acid 2.4 (HH) 0.4 - 2.0 MMOL/L   NT-PRO BNP    Collection Time: 03/06/18  3:43 PM   Result Value Ref Range    NT pro-BNP 2050 (H) 0 - 125 PG/ML   CK W/ CKMB & INDEX    Collection Time: 03/06/18  3:43 PM   Result Value Ref Range    CK 49 26 - 192 U/L    CK - MB 1.5 <3.6 NG/ML    CK-MB Index 3.1 (H) 0 - 2.5     TROPONIN I    Collection Time: 03/06/18  3:43 PM   Result Value Ref Range Troponin-I, Qt. <0.04 <0.05 ng/mL   URINALYSIS W/ REFLEX CULTURE    Collection Time: 03/06/18  6:47 PM   Result Value Ref Range    Color YELLOW/STRAW      Appearance TURBID (A) CLEAR      Specific gravity 1.022 1.003 - 1.030      pH (UA) 6.0 5.0 - 8.0      Protein TRACE (A) NEG mg/dL    Glucose NEGATIVE  NEG mg/dL    Ketone NEGATIVE  NEG mg/dL    Bilirubin NEGATIVE  NEG      Blood TRACE (A) NEG      Urobilinogen 0.2 0.2 - 1.0 EU/dL    Nitrites NEGATIVE  NEG      Leukocyte Esterase MODERATE (A) NEG      WBC PENDING /hpf    RBC PENDING /hpf    Epithelial cells PENDING /lpf    Bacteria PENDING /hpf    UA:UC IF INDICATED PENDING        Radiologic Studies -   CXR Results  (Last 48 hours)               03/06/18 1624  XR CHEST PA LAT Final result    Impression:  IMPRESSION: Pulmonary edema with small bilateral effusions                       Narrative:  EXAM:  XR CHEST PA LAT       INDICATION:   Shortness of breath, stage IV lung cancer. COMPARISON: 1/2/2018. FINDINGS: PA and lateral radiographs of the chest demonstrate a Port-A-Cath with   its tip at the cavoatrial junction. There is a diffuse increase to the   interstitial markings with small bilateral pleural effusions                 Medical Decision Making   I am the first provider for this patient. I reviewed the vital signs, available nursing notes, past medical history, past surgical history, family history and social history. Vital Signs-Reviewed the patient's vital signs. Patient Vitals for the past 12 hrs:   Temp Pulse Resp BP SpO2   03/06/18 1900 - (!) 104 30 138/59 100 %   03/06/18 1830 - 97 28 132/67 100 %   03/06/18 1800 - 100 (!) 33 128/64 100 %   03/06/18 1730 - 97 24 128/53 100 %   03/06/18 1728 - 96 (!) 35 - 100 %   03/06/18 1726 - - - 124/55 -   03/06/18 1523 98.7 °F (37.1 °C) (!) 108 26 152/83 97 %     EKG interpretation: (Preliminary): 1530  Rhythm: sinus tachycardia; and regular . Rate (approx.): 103;  Axis: normal; MD interval: normal; QRS interval: normal ; ST/T wave: nonspecific ST changes. Written by Chelita Issa ED Scribe, as dictated by Nancy Joy DO. Records Reviewed: Nursing Notes and Old Medical Records    Provider Notes (Medical Decision Making):   DDx: COPD exacerbation, pneumonia, pulmonary edema, sepsis, acs, arrhythmia. Pt is on oxygen, is not in any respiratory distress at this time and speaking in full sentences. Pt is reluctant to stay for further evaluation, but agrees to get a CXR and labwork. ED Course:   Initial assessment performed. The patients presenting problems have been discussed, and they are in agreement with the care plan formulated and outlined with them. I have encouraged them to ask questions as they arise throughout their visit. PROGRESS NOTE:  4:08 PM  Pt is requesting to leave after noting she had felt better after a breathing tx. PROGRESS NOTE:  4:21 PM  After discussion with the pt, she agrees to stay and will get a CXR and lab work. PROGRESS NOTE:  4:36 PM  Lactate is 2.4. PROGRESS NOTE:  5:03 PM  Pt has been re-evaluated. She was updated her on her imaging results and she denies any h/o CHF, denies any CP. She states she has been losing weight, not gaining weight. She is willing to stay for further ER evaluation, but is still reluctant to be admitted if necessary. Pt and  state she has had a chest CT done 2 weeks ago to evaluate for improvement of her cancer, and things had been getting better. PROGRESS NOTE:  7:47 PM  Pt has been re-evaluated. Pt was ambulated. She is saying she feels better. Denies any significant SOB when she ambulated. She would like to go home and will give f/u to cardiology. She confirms she will be in touch with Dr. Micha Zamarripa office for a f/u tomorrow. She understands to come back to ER if sxs worsen. Critical Care Time:   0    Disposition:  DISCHARGE NOTE  7:51 PM  The patient has been re-evaluated and is ready for discharge.  Reviewed available results with patient. Counseled patient on diagnosis and care plan. Patient has expressed understanding, and all questions have been answered. Patient agrees with plan and agrees to follow up as recommended, or return to the ED if their symptoms worsen. Discharge instructions have been provided and explained to the patient, along with reasons to return to the ED. PLAN:  1. Discharge  Current Discharge Medication List      START taking these medications    Details   furosemide (LASIX) 20 mg tablet Take 0.5 Tabs by mouth daily. Qty: 3 Tab, Refills: 0           2. Follow-up Information     Follow up With Details Comments Contact Info    Kallie Cronin., MD Schedule an appointment as soon as possible for a visit  [facility] [address] [address] [address] MD KEKE Schedule an appointment as soon as possible for a visit in 1 day  [address] [facility]  [address]  [address] MD José Schedule an appointment as soon as possible for a visit in 1 day  [address] [address]  [phone]      [facility] EMERGENCY DEPT  As needed, If symptoms worsen [address] [place] [address]  [phone]        Return to ED if worse     Diagnosis     Clinical Impression:   1. Dyspnea, unspecified type    2. Acute pulmonary edema (HCC)        Attestations: This note is prepared by Rodrigo Baumgarten, acting as Scribe for Raleigh Swift DO. Raleigh Swift DO: The scribe's documentation has been prepared under my direction and personally reviewed by me in its entirety. I confirm that the note above accurately reflects all work, treatment, procedures, and medical decision making performed by me.

## 2018-03-07 LAB
ATRIAL RATE: 103 BPM
CALCULATED P AXIS, ECG09: 75 DEGREES
CALCULATED R AXIS, ECG10: 56 DEGREES
CALCULATED T AXIS, ECG11: 74 DEGREES
DIAGNOSIS, 93000: NORMAL
P-R INTERVAL, ECG05: 128 MS
Q-T INTERVAL, ECG07: 342 MS
QRS DURATION, ECG06: 108 MS
QTC CALCULATION (BEZET), ECG08: 448 MS
VENTRICULAR RATE, ECG03: 103 BPM

## 2018-03-07 NOTE — DISCHARGE INSTRUCTIONS
Pulmonary Edema: Care Instructions  Your Care Instructions    Pulmonary edema is the buildup of fluid in the lungs. It usually occurs when the heart does not pump blood through the body properly. Pulmonary edema can also be caused by another disease, such as liver or kidney failure. It can also happen at high altitudes, from a poisoning, or as a result of a near-drowning. If you have fluid in your lungs, you may have trouble breathing, be restless, have a fast heart rate, or cough up foamy pink fluid. Breathing problems may be worse when you lie down. Follow-up care is a key part of your treatment and safety. Be sure to make and go to all appointments, and call your doctor if you are having problems. It's also a good idea to know your test results and keep a list of the medicines you take. How can you care for yourself at home? Medicines  ? · Take your medicines exactly as prescribed. Call your doctor if you think you are having a problem with your medicine. ? · Review all of your regular medicines with your doctor. Do not take any vitamins, over-the-counter medicines, or herbal products without talking to your doctor first.   Diet  ? · Eat a balanced diet. Make an appointment with a dietitian if you have questions about what type of diet might be best for you. ? · Do not eat more than 2,000 milligrams (mg) of sodium each day. That is less than 1 teaspoon of salt a day, including all the salt you eat in prepared or packaged foods. ¨ Do not add salt while you are cooking or at the table. Flavor with garlic, lemon juice, onion, vinegar, herbs, and spices instead of salt. ¨ Eat fewer processed foods and foods from restaurants, including fast food. ¨ Use fresh or frozen foods instead of canned. ¨ Count and record how much sodium you eat each day. Check food labels for sodium. ¨ Ask your doctor before using salt substitutes that have potassium, such as Lite Salt. ? Lifestyle  ?  · Stay out of air pollution; smog; cold, dry air; hot, humid air; and high altitudes. ? · Learn breathing methods that help the airflow in and out of your lungs. ? · Take rest breaks often. Schedule short rest breaks when doing housework and other activities. An occupational or physical therapist can help you find ways to do everyday activities with less effort. ? · Start light exercise if your doctor says it is okay. Try to stay as active as possible. If you have not exercised in the past, start out slowly. Walking is a good way to start. ? · Get enough rest at night. Sleeping with 1 or 2 pillows under your upper body and head may help you breathe easier at night. ? · Discuss rehabilitation with your doctor. Find out what programs are available in your area. ? · Do not smoke or use other tobacco products. Smoking can make your condition worse. If you need help quitting, talk to your doctor about stop-smoking programs and medicines. These can increase your chances of quitting for good. ? · Do not use alcohol or illegal drugs. When should you call for help? Call 911 anytime you think you may need emergency care. For example, call if:  ? · You have severe trouble breathing. ? · You passed out (lost consciousness). ? · You have symptoms of a heart attack. These may include:  ¨ Chest pain or pressure, or a strange feeling in the chest.  ¨ Sweating. ¨ Shortness of breath. ¨ Nausea or vomiting. ¨ Pain, pressure, or a strange feeling in the back, neck, jaw, or upper belly or in one or both shoulders or arms. ¨ Lightheadedness or sudden weakness. ¨ A fast or irregular heartbeat. Pain that spreads from the chest to the neck, jaw, or one or both shoulders or arms. ? After you call 911, the  may tell you to chew 1 adult-strength or 2 to 4 low-dose aspirin. Wait for an ambulance. Do not try to drive yourself.   ?Call your doctor now or seek immediate medical care if:  ? · You have trouble breathing or have wheezing that is getting worse. ? · You are coughing more deeply or more often. ? · You cough up blood. ? · You get a fever. ? · You have more swelling in your legs or belly. ? · Your symptoms are getting worse. ? Watch closely for changes in your health, and be sure to contact your doctor if you have any problems. Where can you learn more? Go to http://alonzo-renae.info/. Enter B144 in the search box to learn more about \"Pulmonary Edema: Care Instructions. \"  Current as of: May 12, 2017  Content Version: 11.4  © 2919-0738 [employer]. Care instructions adapted under license by [employer] (which disclaims liability or warranty for this information). If you have questions about a medical condition or this instruction, always ask your healthcare professional. [address] any warranty or liability for your use of this information. Shortness of Breath: Care Instructions  Your Care Instructions  Shortness of breath has many causes. Sometimes conditions such as anxiety can lead to shortness of breath. Some people get mild shortness of breath when they exercise. Trouble breathing also can be a symptom of a serious problem, such as asthma, lung disease, emphysema, heart problems, and pneumonia. If your shortness of breath continues, you may need tests and treatment. Watch for any changes in your breathing and other symptoms. Follow-up care is a key part of your treatment and safety. Be sure to make and go to all appointments, and call your doctor if you are having problems. It's also a good idea to know your test results and keep a list of the medicines you take. How can you care for yourself at home? · Do not smoke or allow others to smoke around you. If you need help quitting, talk to your doctor about stop-smoking programs and medicines. These can increase your chances of quitting for good. · Get plenty of rest and sleep.   · Take your medicines exactly as prescribed. Call your doctor if you think you are having a problem with your medicine. · Find healthy ways to deal with stress. ¨ Exercise daily. ¨ Get plenty of sleep. ¨ Eat regularly and well. When should you call for help? Call 911 anytime you think you may need emergency care. For example, call if:  ? · You have severe shortness of breath. ? · You have symptoms of a heart attack. These may include:  ¨ Chest pain or pressure, or a strange feeling in the chest.  ¨ Sweating. ¨ Shortness of breath. ¨ Nausea or vomiting. ¨ Pain, pressure, or a strange feeling in the back, neck, jaw, or upper belly or in one or both shoulders or arms. ¨ Lightheadedness or sudden weakness. ¨ A fast or irregular heartbeat. After you call 911, the  may tell you to chew 1 adult-strength or 2 to 4 low-dose aspirin. Wait for an ambulance. Do not try to drive yourself. ?Call your doctor now or seek immediate medical care if:  ? · Your shortness of breath gets worse or you start to wheeze. Wheezing is a high-pitched sound when you breathe. ? · You wake up at night out of breath or have to prop your head up on several pillows to breathe. ? · You are short of breath after only light activity or while at rest.   ? Watch closely for changes in your health, and be sure to contact your doctor if:  ? · You do not get better over the next 1 to 2 days. Where can you learn more? Go to http://alonzo-renae.info/. Enter S780 in the search box to learn more about \"Shortness of Breath: Care Instructions. \"  Current as of: May 12, 2017  Content Version: 11.4  © 6408-6061 [employer]. Care instructions adapted under license by [employer] (which disclaims liability or warranty for this information).  If you have questions about a medical condition or this instruction, always ask your healthcare professional. [employer] disclaims any warranty or liability for your use of this information.

## 2018-03-07 NOTE — ED NOTES
Dr. Mike Bacon gave and reviewed discharge instructions with the patient. The patient verbalized understanding. The patient was given opportunity for questions. Patient discharged in stable condition to the waiting room via wheelchair with male visitor.

## 2018-03-07 NOTE — ED NOTES
Assumed care of pt from Semaj Murillo RN at bedside with verbal report consisting of Situation, Background, Assessment, and Recommendations (SBAR). Pt is A&O x 4. Pt resting comfortably on the stretcher in a position of comfort. Call bell within reach. Side rails x 2. Cardiac monitor x 3. Stretcher locked in the lowest position. Concerns and questions addressed at this time. Pt in no acute distress at this the time. Will continue to monitor.

## 2018-03-07 NOTE — ED NOTES
Bedside shift change report given to Tracy SMALLS RN and Cliff Guevara RN (oncoming nurse) by Feroz Ly RN (offgoing nurse). Report included the following information SBAR, ED Summary, MAR and Recent Results.

## 2018-03-07 NOTE — ED NOTES
Pt assisted to ambulate in hallway. Pt sat's between 94-95% via 2L NC. Pt tolerated walking well and states she feels like she is at her baseline. Dr. Kanwal Burgess at bedside updating pt and family on plan of care. Assessed pt's bottom. Wound noted to sacrum. Dry and intact. Asked pt about having O2 for the ride home. Pt's  stated he has a oxygen tank in the car.

## 2018-03-08 LAB
BACTERIA SPEC CULT: NORMAL
CC UR VC: NORMAL
SERVICE CMNT-IMP: NORMAL

## 2018-03-11 LAB
BACTERIA SPEC CULT: NORMAL
SERVICE CMNT-IMP: NORMAL

## 2018-04-10 ENCOUNTER — HOSPITAL ENCOUNTER (OUTPATIENT)
Dept: WOUND CARE | Age: 68
Discharge: HOME | End: 2018-04-10
Payer: MEDICARE

## 2018-04-10 PROCEDURE — 97597 DBRDMT OPN WND 1ST 20 CM/<: CPT

## 2018-04-10 PROCEDURE — 99213 OFFICE O/P EST LOW 20 MIN: CPT

## 2018-04-10 RX ORDER — TRIAMTERENE AND HYDROCHLOROTHIAZIDE 37.5; 25 MG/1; MG/1
CAPSULE ORAL DAILY
COMMUNITY

## 2018-04-10 RX ORDER — POTASSIUM CHLORIDE 750 MG/1
20 TABLET, FILM COATED, EXTENDED RELEASE ORAL DAILY
COMMUNITY

## 2018-04-10 NOTE — H&P
[facility]  WOUND CARE HISTORY AND PHYSICAL      Eleni MORATAYA  MR#: [id number]  : 1950  ACCOUNT #: [de-identified]   ADMIT DATE: 04/10/2018    CHIEF COMPLAINT:  Sacral pressure ulcer. HISTORY OF PRESENT ILLNESS:  This 66-year-old white female presents with an approximately 3-month history of a sacral pressure ulcer that started when she was admitted to the hospital for metastatic lung cancer. The wound has actually improved significantly since that time. She has been using wet-to-dry dressings only. PAST MEDICAL HISTORY:  Significant for stage IV lung cancer, hypertension, dyslipidemia, COPD. CURRENT MEDICATIONS:  Triamterene/HCTZ, Zetia, fenofibrate, Klor-Con, Advair Diskus, ProAir, albuterol, ipratropium. ALLERGIES:  LATEX, DEMEROL, DOXYCYCLINE, ERYTHROMYCIN, HYDROCODONE, PENICILLIN, PERCODAN, SULFA, TETRACYCLINE. FAMILY HISTORY:  Noncontributory. SOCIAL HISTORY:  Quit smoking about 30 years ago. REVIEW OF SYSTEMS:  As per history of present illness. PHYSICAL EXAMINATION:  VITAL SIGNS:  Temperature 98.8, pulse 120, respirations 24, blood pressure 120/70. GENERAL:  She is thin and frail appearing. She is somewhat jaundiced. HEENT:  Atraumatic. NECK:  Supple. CHEST:  Normal respirations. CARDIOVASCULAR:  Regular. ABDOMEN:  Soft. SKIN:  There is a small stage II pressure ulcer of the sacrum. It measures approximately 0.3 x 0.5 x 0.1 cm. There is some yellowish biofilm at the base. EXTREMITIES:  There is no exposed bone. ASSESSMENT:  Small sacral pressure ulcer, stage II, requires selective debridement today. PROCEDURE:  Selective debridement. DESCRIPTION OF PROCEDURE:  Under topical anesthesia, nonviable tissue was sharply excised from the base of the sacral pressure ulcer using a ring curet measuring less than 1 cm in diameter. Blood loss was less than 5 mL, and there were no specimens or complications.     PLAN:  Continue with pressure offloading. Start wound care with Endoform and foam dressing. Follow up in 4 weeks.       MD DIA Becerril/CASSI  D: 04/10/2018 12:08     T: 04/10/2018 12:49  JOB #: [id number]

## 2018-04-16 ENCOUNTER — HOSPITAL ENCOUNTER (OUTPATIENT)
Dept: INFUSION THERAPY | Age: 68
Discharge: HOME | End: 2018-04-16
Payer: MEDICARE

## 2018-04-16 VITALS
RESPIRATION RATE: 30 BRPM | DIASTOLIC BLOOD PRESSURE: 60 MMHG | OXYGEN SATURATION: 98 % | TEMPERATURE: 97.1 F | SYSTOLIC BLOOD PRESSURE: 117 MMHG | HEART RATE: 112 BPM

## 2018-04-16 PROCEDURE — 36591 DRAW BLOOD OFF VENOUS DEVICE: CPT

## 2018-04-16 PROCEDURE — 36415 COLL VENOUS BLD VENIPUNCTURE: CPT | Performed by: INTERNAL MEDICINE

## 2018-04-16 PROCEDURE — 77030012965 HC NDL HUBR BBMI -A

## 2018-04-16 PROCEDURE — 86923 COMPATIBILITY TEST ELECTRIC: CPT | Performed by: INTERNAL MEDICINE

## 2018-04-16 PROCEDURE — 86900 BLOOD TYPING SEROLOGIC ABO: CPT | Performed by: INTERNAL MEDICINE

## 2018-04-16 RX ORDER — SODIUM CHLORIDE 9 MG/ML
250 INJECTION, SOLUTION INTRAVENOUS AS NEEDED
Status: DISCONTINUED | OUTPATIENT
Start: 2018-04-16 | End: 2018-04-20 | Stop reason: HOSPADM

## 2018-04-16 NOTE — PROGRESS NOTES
OPIC short consult note:  1300 Pt seen at MD office today. Add on appointment at [facility]. Pt arrived to [facility] for type and cross via wc and on [email or web address] via NC. Pt in waiting area and dizzy. Pt wheeled to recliner. Pt breathing over 40/minute. Pt breathing slowed down after she was assisted to chair and reclined back. Pt reports that happens when she sits up. Port accessed per protocol. Labs drawn. Port flushed and de-accessed. Blood transfusion discussed. /60  Pulse (!) 112  Temp 97.1 °F (36.2 °C)  Resp 30  SpO2 98%  Medication given:  none  1315 Pt ready for D/C but needed to lay back down before getting in wheel chair to go home. Pt reported this is not new. Worked with pt in slowing down her breathing. Pt and spouse aware that pt may leave when she is comfortable. Pt stayed on unit aprox another 10 minutes.

## 2018-04-17 ENCOUNTER — HOSPITAL ENCOUNTER (OUTPATIENT)
Dept: INFUSION THERAPY | Age: 68
Discharge: HOME | End: 2018-04-17
Payer: MEDICARE

## 2018-04-17 VITALS
SYSTOLIC BLOOD PRESSURE: 133 MMHG | HEART RATE: 107 BPM | RESPIRATION RATE: 18 BRPM | DIASTOLIC BLOOD PRESSURE: 77 MMHG | OXYGEN SATURATION: 98 % | TEMPERATURE: 98 F

## 2018-04-17 PROCEDURE — 36430 TRANSFUSION BLD/BLD COMPNT: CPT

## 2018-04-17 PROCEDURE — P9016 RBC LEUKOCYTES REDUCED: HCPCS | Performed by: INTERNAL MEDICINE

## 2018-04-17 PROCEDURE — 74011250637 HC RX REV CODE- 250/637: Performed by: INTERNAL MEDICINE

## 2018-04-17 PROCEDURE — 74011250636 HC RX REV CODE- 250/636: Performed by: INTERNAL MEDICINE

## 2018-04-17 PROCEDURE — 77010026065 HC OXYGEN MINIMUM MEDICAL AIR

## 2018-04-17 PROCEDURE — 77030012965 HC NDL HUBR BBMI -A

## 2018-04-17 PROCEDURE — 77030013169 SET IV BLD ICUM -A

## 2018-04-17 PROCEDURE — 74011000250 HC RX REV CODE- 250: Performed by: INTERNAL MEDICINE

## 2018-04-17 RX ORDER — SODIUM CHLORIDE 9 MG/ML
250 INJECTION, SOLUTION INTRAVENOUS AS NEEDED
Status: DISCONTINUED | OUTPATIENT
Start: 2018-04-17 | End: 2018-04-21 | Stop reason: HOSPADM

## 2018-04-17 RX ORDER — ACETAMINOPHEN 325 MG/1
650 TABLET ORAL ONCE
Status: COMPLETED | OUTPATIENT
Start: 2018-04-17 | End: 2018-04-17

## 2018-04-17 RX ORDER — DIPHENHYDRAMINE HCL 25 MG
25 CAPSULE ORAL ONCE
Status: COMPLETED | OUTPATIENT
Start: 2018-04-17 | End: 2018-04-17

## 2018-04-17 RX ORDER — HEPARIN 100 UNIT/ML
500 SYRINGE INTRAVENOUS AS NEEDED
Status: SHIPPED | OUTPATIENT
Start: 2018-04-17 | End: 2018-04-18

## 2018-04-17 RX ORDER — SODIUM CHLORIDE 0.9 % (FLUSH) 0.9 %
10-40 SYRINGE (ML) INJECTION AS NEEDED
Status: SHIPPED | OUTPATIENT
Start: 2018-04-17 | End: 2018-04-18

## 2018-04-17 RX ORDER — SODIUM CHLORIDE 9 MG/ML
10 INJECTION INTRAMUSCULAR; INTRAVENOUS; SUBCUTANEOUS AS NEEDED
Status: SHIPPED | OUTPATIENT
Start: 2018-04-17 | End: 2018-04-18

## 2018-04-17 RX ADMIN — ACETAMINOPHEN 650 MG: 325 TABLET ORAL at 09:49

## 2018-04-17 RX ADMIN — SODIUM CHLORIDE 250 ML: 900 INJECTION, SOLUTION INTRAVENOUS at 09:50

## 2018-04-17 RX ADMIN — DIPHENHYDRAMINE HYDROCHLORIDE 25 MG: 25 CAPSULE ORAL at 09:49

## 2018-04-17 RX ADMIN — Medication 10 ML: at 15:59

## 2018-04-17 RX ADMIN — Medication 500 UNITS: at 15:59

## 2018-04-17 RX ADMIN — SODIUM CHLORIDE 10 ML: 9 INJECTION INTRAMUSCULAR; INTRAVENOUS; SUBCUTANEOUS at 09:50

## 2018-04-17 RX ADMIN — Medication 10 ML: at 13:08

## 2018-04-17 NOTE — DISCHARGE INSTRUCTIONS
[facility]    DISCHARGE INSTRUCTIONS FOR:  BLOOD TRANSFUSION    We hope you are feeling better after your blood transfusion. Some mild tenderness or slight bruising at your IV site is normal.  Avoid lifting or heavy use of that extremity for the rest of the day. Drink plenty of fluids, eat a normal diet and get some rest.    There are some important signs that you need to watch for in case you experience a delayed reaction to the blood you have received. Call your physician immediately if you develop any of the following symptoms:    1. Severe headache or backache;    2. Fever above 100 degrees;    3. Chills;    4. Difficulty breathing;    5.  Blood or red color in urine;    6. The feeling of weakness or constant fatigue;    7. Yellowing of the whites of your eyes or skin (jaundice). If your physician is not available, call or go to the nearest emergency room, or dial 911. Peggi Nissen Pool, Signature: ___________________________ 4/17/2018  Yin Shepherd RN [facility]    DISCHARGE INSTRUCTIONS FOR:  BLOOD TRANSFUSION    We hope you are feeling better after your blood transfusion. Some mild tenderness or slight bruising at your IV site is normal.  Avoid lifting or heavy use of that extremity for the rest of the day. Drink plenty of fluids, eat a normal diet and get some rest.    There are some important signs that you need to watch for in case you experience a delayed reaction to the blood you have received. Call your physician immediately if you develop any of the following symptoms:    1. Severe headache or backache;    2. Fever above 100 degrees;    3. Chills;    4. Difficulty breathing;    5.  Blood or red color in urine;    6. The feeling of weakness or constant fatigue;    7. Yellowing of the whites of your eyes or skin (jaundice). If your physician is not available, call or go to the nearest emergency room, or dial 911.     Jessica Ordoñez Signature: ___________________________ 4/17/2018  Carmen Alaniz RN

## 2018-04-17 NOTE — PROGRESS NOTES
0950 Pt arrived at [facility] ambulatory and in no distress for transfusion of 2 units PRBCs. Assessment completed- pt on 2L O2 at home, with dyspnea with exertion, weakness and fatigue. Right chest port accessed without difficulty, flushed with positive blood return noted. Signs/symptoms of adverse blood reaction discussed with pt, voiced understanding. Patient Vitals for the past 12 hrs:   Temp Pulse Resp BP SpO2   04/17/18 1600 98 °F (36.7 °C) (!) 107 18 133/77 -   04/17/18 1520 97.8 °F (36.6 °C) (!) 102 18 124/75 -   04/17/18 1420 97.8 °F (36.6 °C) (!) 103 18 107/65 -   04/17/18 1350 97.9 °F (36.6 °C) 100 18 113/66 -   04/17/18 1335 98.1 °F (36.7 °C) 100 16 108/71 -   04/17/18 1308 97.9 °F (36.6 °C) (!) 101 20 108/62 -   04/17/18 1220 98 °F (36.7 °C) (!) 50 20 110/74 -   04/17/18 1120 98.2 °F (36.8 °C) 98 20 107/58 -   04/17/18 1050 98.1 °F (36.7 °C) 100 20 105/64 -   04/17/18 1035 98 °F (36.7 °C) (!) 105 20 97/60 -   04/17/18 1016 98.1 °F (36.7 °C) (!) 103 20 98/62 -   04/17/18 0940 97.8 °F (36.6 °C) (!) 102 20 113/62 98 %       Medications received:  NS @ KVO  Tylenol 650 mg PO  Benadryl 25 mg PO    1020:  1st unit PRBCs started and infusing without difficulty, observed x 15 minutes  1240:  1st unit completed without adverse reaction noted, NS flushing line. 1320:  2nd unit PRBCs started and infusing without difficulty, observed x 15 minutes  1540:  2nd unit completed without adverse reaction noted, NS flushing line. 1600 Tolerated transfusion well, no adverse reaction noted. D/C instructions reviewed, copy to pt, voiced understanding. Patient declined 1 hour post transfusion observation. D/Cd from [facility] ambulatory and in no distress accompanied by spouse. No further appointments scheduled at this time.

## 2018-04-18 LAB
ABO + RH BLD: NORMAL
BLD PROD TYP BPU: NORMAL
BLD PROD TYP BPU: NORMAL
BLOOD GROUP ANTIBODIES SERPL: NORMAL
BPU ID: NORMAL
BPU ID: NORMAL
CROSSMATCH RESULT,%XM: NORMAL
CROSSMATCH RESULT,%XM: NORMAL
SPECIMEN EXP DATE BLD: NORMAL
STATUS OF UNIT,%ST: NORMAL
STATUS OF UNIT,%ST: NORMAL
UNIT DIVISION, %UDIV: 0
UNIT DIVISION, %UDIV: 0

## (undated) DEVICE — REM POLYHESIVE ADULT PATIENT RETURN ELECTRODE: Brand: VALLEYLAB

## (undated) DEVICE — INTENDED FOR TISSUE SEPARATION, AND OTHER PROCEDURES THAT REQUIRE A SHARP SURGICAL BLADE TO PUNCTURE OR CUT.: Brand: BARD-PARKER ® CARBON RIB-BACK BLADES

## (undated) DEVICE — MEDI-VAC YANK SUCT HNDL W/TPRD BULBOUS TIP: Brand: CARDINAL HEALTH

## (undated) DEVICE — 3M™ IOBAN™ 2 ANTIMICROBIAL INCISE DRAPE 6648EZ: Brand: IOBAN™ 2

## (undated) DEVICE — SOLIDIFIER MEDC 1200ML -- CONVERT TO 356117

## (undated) DEVICE — CATH IV AUTOGRD BC PNK 20GA 25 -- INSYTE

## (undated) DEVICE — PREP SKN PREVAIL 40ML APPL --

## (undated) DEVICE — NEEDLE ASPIR 22GA L40MM WRK L70CM SYR VLV ECHOGENIC DIMPLED

## (undated) DEVICE — NEONATAL-ADULT SPO2 SENSOR: Brand: NELLCOR

## (undated) DEVICE — SYRINGE MED 20ML STD CLR PLAS LUERSLIP TIP N CTRL DISP

## (undated) DEVICE — SOLUTION IV 500ML 0.9% SOD CHL FLX CONT

## (undated) DEVICE — NEEDLE HYPO 25GA L1.5IN BVL ORIENTED ECLIPSE

## (undated) DEVICE — HANDLE LT SNAP ON ULT DURABLE LENS FOR TRUMPF ALC DISPOSABLE

## (undated) DEVICE — SHEET, T, LAPAROTOMY, STERILE: Brand: MEDLINE

## (undated) DEVICE — MASK,OXYGEN,3-IN-1,ADULT,7,SC: Brand: MEDLINE

## (undated) DEVICE — SYR 10ML LUER LOK 1/5ML GRAD --

## (undated) DEVICE — (D)SYR 10ML 1/5ML GRAD NSAF -- PKGING CHANGE USE ITEM 338027

## (undated) DEVICE — FALCON® SAMPLE CONTAINER, WITH LID, 4.5OZ (110ML), INDIVIDUALLY WRAPPED, STERILE, 100/CASE: Brand: FALCON A CORNING BRAND

## (undated) DEVICE — SOLUTION IV 1000ML 0.9% SOD CHL

## (undated) DEVICE — Device: Brand: BALLOON

## (undated) DEVICE — Z DISCONTINUED PER MEDLINE LINE GAS SAMPLING O2/CO2 LNG AD 13 FT NSL W/ TBNG FILTERLINE

## (undated) DEVICE — STERILE POLYISOPRENE POWDER-FREE SURGICAL GLOVES: Brand: PROTEXIS

## (undated) DEVICE — STOPCOCK IV 4 W TRNSPAR

## (undated) DEVICE — SINGLE USE SUCTION VALVE MAJ-209: Brand: SINGLE USE SUCTION VALVE (STERILE)

## (undated) DEVICE — BASIN EMSIS 16OZ GRAPHITE PLAS KID SHP MOLD GRAD FOR ORAL

## (undated) DEVICE — BLOCK BITE ENDOSCP AD 21 MM W/ DIL BLU LF DISP

## (undated) DEVICE — NEEDLE HYPO 18GA L1.5IN PNK S STL HUB POLYPR SHLD REG BVL

## (undated) DEVICE — TOWEL 4 PLY TISS 19X30 SUE WHT

## (undated) DEVICE — SINGLE USE BIOPSY VALVE MAJ-210: Brand: SINGLE USE BIOPSY VALVE (STERILE)

## (undated) DEVICE — ROCKER SWITCH PENCIL BLADE ELECTRODE, HOLSTER: Brand: EDGE

## (undated) DEVICE — SET ADMIN 16ML TBNG L100IN 2 Y INJ SITE IV PIGGY BK DISP

## (undated) DEVICE — (D)ATOMIZER LARYNGO TRACHAEL -- DISC BY MFR NO SUB

## (undated) DEVICE — SYR 3ML LL TIP 1/10ML GRAD --

## (undated) DEVICE — SURGICAL PROCEDURE PACK BASIN MAJ SET CUST NO CAUT

## (undated) DEVICE — SUT VCRL 3-0 18IN TIE MP VIO --

## (undated) DEVICE — Device

## (undated) DEVICE — KENDALL RADIOLUCENT FOAM MONITORING ELECTRODE RECTANGULAR SHAPE: Brand: KENDALL

## (undated) DEVICE — SUTURE MCRYL SZ 4-0 L27IN ABSRB UD L19MM PS-2 1/2 CIR PRIM Y426H

## (undated) DEVICE — FCPS BIOP PULM RAD JAW 100CML -- BX/10 M00515180

## (undated) DEVICE — (D)PREP SKN CHLRAPRP APPL 26ML -- CONVERT TO ITEM 371833

## (undated) DEVICE — Device: Brand: MEDEX

## (undated) DEVICE — GOWN,SIRUS,NONRNF,SETINSLV,2XL,18/CS: Brand: MEDLINE

## (undated) DEVICE — YANKAUER BULB TIP, NO VENT: Brand: ARGYLE

## (undated) DEVICE — INFECTION CONTROL KIT SYS

## (undated) DEVICE — 1200 GUARD II KIT W/5MM TUBE W/O VAC TUBE: Brand: GUARDIAN

## (undated) DEVICE — DERMABOND SKIN ADH 0.7ML -- DERMABOND ADVANCED 12/BX

## (undated) DEVICE — SUTURE VCRL SZ 3-0 L27IN ABSRB UD L26MM SH 1/2 CIR J416H

## (undated) DEVICE — Z CONVERTED USE 2107985 COVER FLROSCP W36XL28IN 4 SIDE ADH

## (undated) DEVICE — DEVON™ KNEE AND BODY STRAP 60" X 3" (1.5 M X 7.6 CM): Brand: DEVON